# Patient Record
Sex: FEMALE | Race: WHITE | Employment: OTHER | URBAN - METROPOLITAN AREA
[De-identification: names, ages, dates, MRNs, and addresses within clinical notes are randomized per-mention and may not be internally consistent; named-entity substitution may affect disease eponyms.]

---

## 2018-08-11 ENCOUNTER — HOSPITAL ENCOUNTER (OUTPATIENT)
Dept: MAMMOGRAPHY | Age: 58
Discharge: HOME | End: 2018-08-11
Payer: COMMERCIAL

## 2018-08-11 DIAGNOSIS — Z12.31 VISIT FOR SCREENING MAMMOGRAM: ICD-10-CM

## 2018-08-11 PROCEDURE — 77063 BREAST TOMOSYNTHESIS BI: CPT

## 2020-08-10 PROBLEM — Z12.11 SPECIAL SCREENING FOR MALIGNANT NEOPLASMS, COLON: Status: ACTIVE | Noted: 2020-08-10

## 2020-08-17 ENCOUNTER — HOSPITAL ENCOUNTER (OUTPATIENT)
Dept: MAMMOGRAPHY | Age: 60
Discharge: HOME | End: 2020-08-17
Attending: FAMILY MEDICINE
Payer: COMMERCIAL

## 2020-08-17 DIAGNOSIS — Z80.3 FAMILY HX-BREAST MALIGNANCY: ICD-10-CM

## 2020-08-17 DIAGNOSIS — Z12.31 VISIT FOR SCREENING MAMMOGRAM: ICD-10-CM

## 2020-08-17 PROCEDURE — 77063 BREAST TOMOSYNTHESIS BI: CPT

## 2020-12-15 ENCOUNTER — HOSPITAL ENCOUNTER (OUTPATIENT)
Dept: SURGERY | Age: 60
Discharge: HOME | End: 2020-12-15
Attending: ORTHOPAEDIC SURGERY
Payer: COMMERCIAL

## 2020-12-15 ENCOUNTER — HOSPITAL ENCOUNTER (OUTPATIENT)
Dept: PHYSICAL THERAPY | Age: 60
Discharge: HOME | End: 2020-12-15
Attending: ORTHOPAEDIC SURGERY
Payer: COMMERCIAL

## 2020-12-15 ENCOUNTER — HOME HEALTH ADMISSION (OUTPATIENT)
Dept: HOME HEALTH SERVICES | Facility: HOME HEALTH | Age: 60
End: 2020-12-15
Payer: COMMERCIAL

## 2020-12-15 VITALS
OXYGEN SATURATION: 97 % | RESPIRATION RATE: 12 BRPM | HEART RATE: 71 BPM | DIASTOLIC BLOOD PRESSURE: 92 MMHG | BODY MASS INDEX: 33.23 KG/M2 | HEIGHT: 62 IN | WEIGHT: 180.6 LBS | SYSTOLIC BLOOD PRESSURE: 159 MMHG | TEMPERATURE: 98.4 F

## 2020-12-15 LAB
ANION GAP SERPL CALC-SCNC: 8 MMOL/L (ref 7–16)
APTT PPP: 27 SEC (ref 24.1–35.1)
BACTERIA SPEC CULT: NORMAL
BASOPHILS # BLD: 0 K/UL (ref 0–0.2)
BASOPHILS NFR BLD: 1 % (ref 0–2)
BUN SERPL-MCNC: 14 MG/DL (ref 8–23)
CALCIUM SERPL-MCNC: 9.4 MG/DL (ref 8.3–10.4)
CHLORIDE SERPL-SCNC: 108 MMOL/L (ref 98–107)
CO2 SERPL-SCNC: 24 MMOL/L (ref 21–32)
CREAT SERPL-MCNC: 0.77 MG/DL (ref 0.6–1)
DIFFERENTIAL METHOD BLD: NORMAL
EOSINOPHIL # BLD: 0.1 K/UL (ref 0–0.8)
EOSINOPHIL NFR BLD: 1 % (ref 0.5–7.8)
ERYTHROCYTE [DISTWIDTH] IN BLOOD BY AUTOMATED COUNT: 13.2 % (ref 11.9–14.6)
EST. AVERAGE GLUCOSE BLD GHB EST-MCNC: 120 MG/DL
GLUCOSE SERPL-MCNC: 88 MG/DL (ref 65–100)
HBA1C MFR BLD: 5.8 %
HCT VFR BLD AUTO: 41.8 % (ref 35.8–46.3)
HGB BLD-MCNC: 13.8 G/DL (ref 11.7–15.4)
IMM GRANULOCYTES # BLD AUTO: 0 K/UL (ref 0–0.5)
IMM GRANULOCYTES NFR BLD AUTO: 0 % (ref 0–5)
INR PPP: 1
LYMPHOCYTES # BLD: 1.4 K/UL (ref 0.5–4.6)
LYMPHOCYTES NFR BLD: 24 % (ref 13–44)
MCH RBC QN AUTO: 28.6 PG (ref 26.1–32.9)
MCHC RBC AUTO-ENTMCNC: 33 G/DL (ref 31.4–35)
MCV RBC AUTO: 86.5 FL (ref 79.6–97.8)
MONOCYTES # BLD: 0.5 K/UL (ref 0.1–1.3)
MONOCYTES NFR BLD: 8 % (ref 4–12)
NEUTS SEG # BLD: 3.9 K/UL (ref 1.7–8.2)
NEUTS SEG NFR BLD: 66 % (ref 43–78)
NRBC # BLD: 0 K/UL (ref 0–0.2)
PLATELET # BLD AUTO: 336 K/UL (ref 150–450)
PMV BLD AUTO: 9.5 FL (ref 9.4–12.3)
POTASSIUM SERPL-SCNC: 3.9 MMOL/L (ref 3.5–5.1)
PROTHROMBIN TIME: 13.4 SEC (ref 12.5–14.7)
RBC # BLD AUTO: 4.83 M/UL (ref 4.05–5.2)
SERVICE CMNT-IMP: NORMAL
SODIUM SERPL-SCNC: 140 MMOL/L (ref 136–145)
WBC # BLD AUTO: 5.9 K/UL (ref 4.3–11.1)

## 2020-12-15 PROCEDURE — 85730 THROMBOPLASTIN TIME PARTIAL: CPT

## 2020-12-15 PROCEDURE — 87641 MR-STAPH DNA AMP PROBE: CPT

## 2020-12-15 PROCEDURE — 83036 HEMOGLOBIN GLYCOSYLATED A1C: CPT

## 2020-12-15 PROCEDURE — 36415 COLL VENOUS BLD VENIPUNCTURE: CPT

## 2020-12-15 PROCEDURE — 85025 COMPLETE CBC W/AUTO DIFF WBC: CPT

## 2020-12-15 PROCEDURE — 77030027138 HC INCENT SPIROMETER -A

## 2020-12-15 PROCEDURE — 97161 PT EVAL LOW COMPLEX 20 MIN: CPT

## 2020-12-15 PROCEDURE — 85610 PROTHROMBIN TIME: CPT

## 2020-12-15 PROCEDURE — 80048 BASIC METABOLIC PNL TOTAL CA: CPT

## 2020-12-15 RX ORDER — ACYCLOVIR 50 MG/G
CREAM TOPICAL AS NEEDED
COMMUNITY
End: 2020-12-15 | Stop reason: SDUPTHER

## 2020-12-15 NOTE — PERIOP NOTES
Lab results within anesthesia guidelines. MRSA swab result pending. All results routed/faxed to pt's PCP, Ghazal Barber MD, per surgeon's order. Recent Results (from the past 12 hour(s))   CBC WITH AUTOMATED DIFF    Collection Time: 12/15/20  9:38 AM   Result Value Ref Range    WBC 5.9 4.3 - 11.1 K/uL    RBC 4.83 4.05 - 5.2 M/uL    HGB 13.8 11.7 - 15.4 g/dL    HCT 41.8 35.8 - 46.3 %    MCV 86.5 79.6 - 97.8 FL    MCH 28.6 26.1 - 32.9 PG    MCHC 33.0 31.4 - 35.0 g/dL    RDW 13.2 11.9 - 14.6 %    PLATELET 027 148 - 043 K/uL    MPV 9.5 9.4 - 12.3 FL    ABSOLUTE NRBC 0.00 0.0 - 0.2 K/uL    DF AUTOMATED      NEUTROPHILS 66 43 - 78 %    LYMPHOCYTES 24 13 - 44 %    MONOCYTES 8 4.0 - 12.0 %    EOSINOPHILS 1 0.5 - 7.8 %    BASOPHILS 1 0.0 - 2.0 %    IMMATURE GRANULOCYTES 0 0.0 - 5.0 %    ABS. NEUTROPHILS 3.9 1.7 - 8.2 K/UL    ABS. LYMPHOCYTES 1.4 0.5 - 4.6 K/UL    ABS. MONOCYTES 0.5 0.1 - 1.3 K/UL    ABS. EOSINOPHILS 0.1 0.0 - 0.8 K/UL    ABS. BASOPHILS 0.0 0.0 - 0.2 K/UL    ABS. IMM.  GRANS. 0.0 0.0 - 0.5 K/UL   METABOLIC PANEL, BASIC    Collection Time: 12/15/20  9:38 AM   Result Value Ref Range    Sodium 140 136 - 145 mmol/L    Potassium 3.9 3.5 - 5.1 mmol/L    Chloride 108 (H) 98 - 107 mmol/L    CO2 24 21 - 32 mmol/L    Anion gap 8 7 - 16 mmol/L    Glucose 88 65 - 100 mg/dL    BUN 14 8 - 23 MG/DL    Creatinine 0.77 0.6 - 1.0 MG/DL    GFR est AA >60 >60 ml/min/1.73m2    GFR est non-AA >60 >60 ml/min/1.73m2    Calcium 9.4 8.3 - 10.4 MG/DL   PROTHROMBIN TIME + INR    Collection Time: 12/15/20  9:38 AM   Result Value Ref Range    Prothrombin time 13.4 12.5 - 14.7 sec    INR 1.0     PTT    Collection Time: 12/15/20  9:38 AM   Result Value Ref Range    aPTT 27.0 24.1 - 35.1 SEC   HEMOGLOBIN A1C WITH EAG    Collection Time: 12/15/20  9:39 AM   Result Value Ref Range    Hemoglobin A1c 5.8 %    Est. average glucose 120 mg/dL

## 2020-12-15 NOTE — PROGRESS NOTES
Iker Echeverria  : (70 y.o.) Joint Christy Hagen at [address] [address].  [phone]       Physical Therapy Prehab Plan of Treatment and Evaluation Summary:12/15/2020    ICD-10: Treatment Diagnosis:   · Pain in Left Knee (M25.562)  · Stiffness of Left Knee, Not elsewhere classified (N88.283)  Precautions/Allergies:   Patient has no known allergies. MEDICAL/REFERRING DIAGNOSIS:  Unilateral primary osteoarthritis, left knee [M17.12]  REFERRING PHYSICIAN: Ceasar Mcdermott MD  DATE OF SURGERY: 21    Assessment:   Comments:  She is here alone. She plans on having a L TKA and needs a R TKA. She will go home at [place] and her  Spouse will offer assistance     PROBLEM LIST (Impacting functional limitations):  Ms. Yamil Espinoza presents with the following left lower extremity(s) problems:  1. Strength  2. Range of Motion  3. Home Exercise Program  4. Pain   INTERVENTIONS PLANNED:  1. Home Exercise Program  2. Educational Discussion      TREATMENT PLAN: Effective Dates: 12/15/2020 TO 12/15/2020. Frequency/Duration: Patient to continue to perform home exercise program at least twice per day up until her surgery. GOALS: (Goals have been discussed and agreed upon with patient.)  Discharge Goals: Time Frame: 1 Day  1. Patient will demonstrate independence with a home exercise program designed to increase strength, range of motion and pain control to minimize functional deficits and optimize patient for total joint replacement. Rehabilitation Potential For Stated Goals: Good  Regarding Dante Bhakta's therapy, I certify that the treatment plan above will be carried out by a therapist or under their direction.   Thank you for this referral,  Luana Lang, PT               HISTORY:   Present Symptoms:  Pain Intensity 1: 4(at its worst)  Pain Location 1: Knee  Pain Orientation 1: Anterior, Lateral, Medial, Posterior, Left   History of Present Injury/Illness (Reason for Referral):  Medical/Referring Diagnosis: Unilateral primary osteoarthritis, left knee [M17.12]   Past Medical History/Comorbidities:   Ms. Melissa Valentine  has a past medical history of Arthritis, Constipation, High cholesterol, and Postmenopausal.  Ms. Melissa Valentine  has a past surgical history that includes hx hysterectomy (2002); hx breast biopsy (Left, 03/18/2013); hx cholecystectomy (12/19/2011); hx orthopaedic (Left, 03/20/2014); and hx knee arthroscopy (Left, 2005).   Social History/Living Environment:   Home Environment: Private residence  # Steps to Enter: 3  Hand Rails : Bilateral  Wheelchair Ramp: (spouse is building one)  One/Two Story Residence: Two story, live on 1st floor  # of Interior Steps: 12  Interior Rails: Left  Support Systems: Spouse/Significant Other/Partner  Patient Expects to be Discharged to[de-identified] Private residence  Current DME Used/Available at Home: [address] [facility], [place], [facility], [facility], [address] chair  Tub or Shower Type: Tub/Shower combination  Work/Activity:  retired  Dominant Side:  RIGHT  Current Medications:  See Pre-assessment nursing note   Number of Personal Factors/Comorbidities that affect the Plan of Care: 1-2: MODERATE COMPLEXITY   EXAMINATION:   ADLs (Current Functional Status):   Ambulation:  [x] Independent  [] Walk Indoors Only  [] Walk Outdoors  [] Use Assistive Device  [] Use Wheelchair Only Dressing:  [x] Independent  Requires Assistance from Someone for:  [] Sock/Shoes  [] Pants  [] Everything   Bathing/Showering:   [x] Independent  [] Requires Assistance from Someone  [] [address]:  [x] Routine house and yard work  [] Light Housework Only  [] None   Observation/Orthostatic Postural Assessment:    Genu valgus left, Genu valgus right, Leg length discrepancy, right(R LE 1/8\" shorter than L)  ROM/Flexibility:   AROM: Within functional limits(R LE)                LLE AROM  L Knee Flexion: 72  L Knee Extension: 10          Strength:   Strength: Generally decreased, functional(R LE 4/5)       LLE Strength  L Hip Flexion: 4  L Knee Extension: 3+  L Ankle Dorsiflexion: 4          Functional Mobility:    Sensation: Intact(R LE)    Stand to Sit: Independent  Sit to Stand: Independent  Stand Pivot Transfers: Independent             Balance:    Sitting: Intact  Standing: Intact   Body Structures Involved:  1. Bones  2. Joints  3. Muscles Body Functions Affected:  1. Movement Related Activities and Participation Affected:  1. General Tasks and Demands  2. Mobility   Number of elements that affect the Plan of Care: 4+: HIGH COMPLEXITY   CLINICAL PRESENTATION:   Presentation: Stable and uncomplicated: LOW COMPLEXITY   CLINICAL DECISION MAKING:   Outcome Measure: Tool Used: Lower Extremity Functional Scale (LEFS)  Score:  Initial: 70/80 Most Recent: X/80 (Date: -- )   Interpretation of Score: 20 questions each scored on a 5 point scale with 0 representing \"extreme difficulty or unable to perform\" and 4 representing \"no difficulty\". The lower the score, the greater the functional disability. 80/80 represents no disability. Minimal detectable change is 9 points. Medical Necessity:   · Ms. Becky Soler is expected to optimize her lower extremity strength and ROM in preparation for joint replacement surgery. Reason for Services/Other Comments:  · Achieve baseline assesment of musculoskeletal system, functional mobility and home environment. , educate in PT HEP in preparation for surgery, educate in hospital plan of care. Use of outcome tool(s) and clinical judgement create a POC that gives a: Clear prediction of patient's progress: LOW COMPLEXITY   TREATMENT:   Treatment/Session Assessment:  Patient was instructed in PT- HEP to increase strength and ROM in LEs. Answered all questions. · Post session pain:  2  · Compliance with Program/Exercises: compliant most of the time.   Total Treatment Duration:  PT Patient Time In/Time Out  Time In: 0930  Time Out: [address] Elizondo, PT

## 2020-12-15 NOTE — PERIOP NOTES
PLEASE CONTINUE TAKING ALL PRESCRIPTION MEDICATIONS UP TO THE DAY OF SURGERY UNLESS OTHERWISE DIRECTED BELOW. DISCONTINUE all vitamins and supplements 21 days prior to surgery. DISCONTINUE Non-Steriodal Anti-Inflammatory (NSAIDS) such as Advil and Aleve 5 days prior to surgery. Home Medications to take  the day of surgery              Home Medications   to Hold           Comments   BRING: Livalo and acyclovir cream in original RX bottle        *Visitor policy of 1 visitor per patient discussed. Please do not bring home medications with you on the day of surgery unless otherwise directed by your nurse. If you are instructed to bring home medications, please give them to your nurse as they will be administered by the nursing staff. If you have any questions, please call [address] [place] [address] [phone] or [facility] [phone]. A copy of this note was provided to the patient for reference.

## 2020-12-15 NOTE — ADVANCED PRACTICE NURSE
Total Joint Surgery Preoperative Chart Review      Patient ID:  Juan David  [id number]  9 y.o.  1960  Surgeon: Dr. Wesley Fisher  Date of Surgery: 1/7/2021  Procedure: Total Left Knee Arthroplasty  Primary Care Physician: Rochelle Aguirre [phone]  Specialty Physician(s):      Subjective:   Juan David is a 56 y.o. WHITE OR  female who presents for preoperative evaluation for Total Left Knee arthroplasty. This is a preoperative chart review note based on data collected by the nurse at the surgical Pre-Assessment visit. Past Medical History:   Diagnosis Date    Arthritis     Constipation     High cholesterol     managed with medication    Postmenopausal     Varicose vein of leg     left leg       Past Surgical History:   Procedure Laterality Date    HX BREAST BIOPSY Left 03/18/2013    HX CHOLECYSTECTOMY  12/19/2011    HX HYSTERECTOMY  2002    vaginal    HX KNEE ARTHROSCOPY Left 2005    meniscus repair    HX ORTHOPAEDIC Left 03/20/2014    foot sx     Family History   Problem Relation Age of Onset    Breast Cancer Mother 72    Heart Disease Father 54        sudden death - MI vs ? LQT    Other Niece         LQTS    Other Nephew         LQTS      Social History     Tobacco Use    Smoking status: Never Smoker    Smokeless tobacco: Never Used   Substance Use Topics    Alcohol use: Yes     Alcohol/week: 1.0 standard drinks     Types: 1 Glasses of wine per week     Frequency: 2-4 times a month       Prior to Admission medications    Medication Sig Start Date End Date Taking? Authorizing Provider   acyclovir (ZOVIRAX) 5 % topical cream Apply  to affected area as needed. Yes Provider, Historical   psyllium husk (METAMUCIL PO) Take  by mouth every other day. Yes Provider, Historical   aspirin delayed-release 81 mg tablet Take 81 mg by mouth every other day. Yes Provider, Historical   pitavastatin calcium (Livalo) 4 mg tab tablet Take 1 Tab by mouth nightly.  11/19/20   Prudence Cord, Trey Malin MD     No Known Allergies       Objective:     Physical Exam:   Patient Vitals for the past 24 hrs:   Temp Pulse Resp BP SpO2   12/15/20 1033 98.4 °F (36.9 °C) 71 12 (!) 159/92 97 %       ECG:    EKG Results     None          Data Review:   Labs:   Recent Results (from the past 24 hour(s))   CBC WITH AUTOMATED DIFF    Collection Time: 12/15/20  9:38 AM   Result Value Ref Range    WBC 5.9 4.3 - 11.1 K/uL    RBC 4.83 4.05 - 5.2 M/uL    HGB 13.8 11.7 - 15.4 g/dL    HCT 41.8 35.8 - 46.3 %    MCV 86.5 79.6 - 97.8 FL    MCH 28.6 26.1 - 32.9 PG    MCHC 33.0 31.4 - 35.0 g/dL    RDW 13.2 11.9 - 14.6 %    PLATELET 002 901 - 224 K/uL    MPV 9.5 9.4 - 12.3 FL    ABSOLUTE NRBC 0.00 0.0 - 0.2 K/uL    DF AUTOMATED      NEUTROPHILS 66 43 - 78 %    LYMPHOCYTES 24 13 - 44 %    MONOCYTES 8 4.0 - 12.0 %    EOSINOPHILS 1 0.5 - 7.8 %    BASOPHILS 1 0.0 - 2.0 %    IMMATURE GRANULOCYTES 0 0.0 - 5.0 %    ABS. NEUTROPHILS 3.9 1.7 - 8.2 K/UL    ABS. LYMPHOCYTES 1.4 0.5 - 4.6 K/UL    ABS. MONOCYTES 0.5 0.1 - 1.3 K/UL    ABS. EOSINOPHILS 0.1 0.0 - 0.8 K/UL    ABS. BASOPHILS 0.0 0.0 - 0.2 K/UL    ABS. IMM.  GRANS. 0.0 0.0 - 0.5 K/UL   METABOLIC PANEL, BASIC    Collection Time: 12/15/20  9:38 AM   Result Value Ref Range    Sodium 140 136 - 145 mmol/L    Potassium 3.9 3.5 - 5.1 mmol/L    Chloride 108 (H) 98 - 107 mmol/L    CO2 24 21 - 32 mmol/L    Anion gap 8 7 - 16 mmol/L    Glucose 88 65 - 100 mg/dL    BUN 14 8 - 23 MG/DL    Creatinine 0.77 0.6 - 1.0 MG/DL    GFR est AA >60 >60 ml/min/1.73m2    GFR est non-AA >60 >60 ml/min/1.73m2    Calcium 9.4 8.3 - 10.4 MG/DL   PROTHROMBIN TIME + INR    Collection Time: 12/15/20  9:38 AM   Result Value Ref Range    Prothrombin time 13.4 12.5 - 14.7 sec    INR 1.0     PTT    Collection Time: 12/15/20  9:38 AM   Result Value Ref Range    aPTT 27.0 24.1 - 35.1 SEC   HEMOGLOBIN A1C WITH EAG    Collection Time: 12/15/20  9:39 AM   Result Value Ref Range    Hemoglobin A1c 5.8 %    Est. average glucose 120 mg/dL         Problem List:  )  Patient Active Problem List   Diagnosis Code    Family hx-breast malignancy Z80.3    Special screening for malignant neoplasms, colon Z12.11       Total Joint Surgery Pre-Assessment Recommendations:           Recommend continuous saturation monitoring hours of sleep, during hospitalization. O2 prn per respiratory protocol.        Signed By: Ashleigh Ontiveros NP-C    December 15, 2020

## 2020-12-15 NOTE — PROGRESS NOTES
12/15/20 0900   Oxygen Therapy   O2 Sat (%) 95 %   Pulse via Oximetry 70 beats per minute   O2 Device Room air   Pre-Treatment   Breath Sounds Bilateral Clear   Pre FEV1 (liters) 2.3 liters   % Predicted 95     Initial respiratory Assessment completed with pt. Pt was interviewed and evaluated in Joint camp prior to surgery. Patient ID:  Kenisha Valerio  [id number]  51 y.o.  1960  Surgeon: Dr. Morgan Beach  Date of Surgery: 1/7/2021  Procedure: Total Left Knee Arthroplasty  Primary Care Physician: Myron Atkinson [phone]  Specialists:     Pt. IS SOMEWHAT PRACTICING SOCIAL DISTANCING AND WEARING A MASK WHEN IN PUBLIC. Pt taught proper COUGH technique  DIAPHRAGMATIC BREATHING EXERCISE INSTRUCTIONS GIVEN    History of smoking:   DENIES                 Quit date:         Secondhand smoke:FATHER    Past procedures with Oxygen desaturation or delayed awakening:DENIES    Past Medical History:   Diagnosis Date    Arthritis     Constipation     High cholesterol     managed with medication    Postmenopausal     Varicose vein of leg     left leg         Respiratory history:DENIES SOB                                                                  Respiratory meds:  DENIES    FAMILY PRESENT:             NO                                                   PAST SLEEP STUDY:                       DENIES  HX OF MADDIE:                                      DENIES  MADDIE assessment:                                               SLEEPS ON       STOMACH  PHYSICAL EXAM   Body mass index is 33.03 kg/m².    Visit Vitals  BP (!) 159/92 (BP 1 Location: Left arm, BP Patient Position: At rest;Sitting)   Pulse 71   Temp 98.4 °F (36.9 °C)   Resp 12   Ht 5' 2\" (1.575 m)   Wt 81.9 kg (180 lb 9.6 oz)   SpO2 97%   BMI 33.03 kg/m²     Neck circumference:  32    cm    Loud snoring:                                                 YES             Witnessed apnea or wakening gasping or choking:        DENIES        Awakens with headaches: DENIES  Morning or daytime tiredness/ sleepiness:                      DENIES           Dry mouth or sore throat in morning:                     DENIES                                   Presley stage:  4                                   SACS score:1  Stop Bang   STOP-BANG  Does the patient snore loudly (louder than talking or loud enough to be heard through closed doors)?: Yes  Does the patient often feel tired, fatigued, or sleepy during the daytime, even after a \"good\" night's sleep?: No  Has anyone ever observed the patient stop breathing during their sleep? : No  Does the patient have or are they being treated for high blood pressure?: No  Is the patient's BMI greater than 35?: No  Is your neck circumference greater than 17 inches (Male) or 16 inches (Female)?: No  Is the patient older than 48?: Yes  Is the patient male?: No  MADDIE Score: 2                            CS HS                                        Referrals:    Pt. Phone Number:

## 2020-12-15 NOTE — PERIOP NOTES
Patient verified name and . Order for consent found in EHR and matches case posting; patient verified. Type 3 surgery, joint PAT assessment complete. Labs per surgeon: CBC, BMP, PT/PTT, Hgba1c- results WNL per anesthesia guidelines  Labs per anesthesia protocol: no additional  EKG: done 2020- tracing in EMR and result reviewed by Dr Alondra Dooley ([facility] cardio)    Pt had cardiac consult with Dr Alondra Dooley at [address] Cardiology 2020 for 'chest discomfort' and family hx of possible LQT. Note states: \"Symptoms have essentially resolved with change of medications, no further evaluation at this time but return should symptoms change or recur. .. Low perioperative risk for knee surgery. \"    Patient aware that a negative Covid swab result is required to proceed with surgery; appointments are made by the surgeon office and should test should be collected 7 days prior to surgery. [facility] [place] MRSA/MSSA swab collected; pharmacy to review and dose antibiotic as appropriate. Hospital approved surgical skin cleanser and instructions to return bottle on DOS given per hospital policy. Patient provided with handouts including Guide to Surgery, Pain Management, Hand Hygiene, Blood Transfusion Education, and [facility] Brochure. Patient answered medical/surgical history questions at their best of ability. All prior to admission medications documented in [facility]. Original medication prescription bottle (x1) visualized during patient appointment. Patient instructed to hold all vitamins 3 weeks prior to surgery and NSAIDS 5 days prior to surgery. Patient teach back successful and patient demonstrates knowledge of instruction.

## 2021-01-04 NOTE — H&P
H&P    Patient ID:  Trace Mcclain  [id number]  30 y.o.  1960  Surgeon:  Chris Melo MD  Date of Surgery: * No surgery date entered *  Procedure: robot assisted  Left Knee Total Arthroplasty  Primary Care Physician: Parminder Cassidy MD        Subjective:  Trace Mcclain is a 56 y.o. WHITE OR  female who presents with Left Knee pain. She has history of Left Knee pain for several months. Symptoms worse with walking long distances and relieved with rest. Conservative treatment consisting of  activity modification and injections have not helped. The patient lives with their family. The patients goal after surgery is improved pain and function. Past Medical History:   Diagnosis Date    Arthritis     Constipation     High cholesterol     managed with medication    Postmenopausal     Varicose vein of leg     left leg       Past Surgical History:   Procedure Laterality Date    HX BREAST BIOPSY Left 03/18/2013    HX CHOLECYSTECTOMY  12/19/2011    HX HYSTERECTOMY  2002    vaginal    HX KNEE ARTHROSCOPY Left 2005    meniscus repair    HX ORTHOPAEDIC Left 03/20/2014    foot sx     Family History   Problem Relation Age of Onset    Breast Cancer Mother 72    Heart Disease Father 54        sudden death - MI vs ? LQT    Other Niece         LQTS    Other Nephew         LQTS      Social History     Tobacco Use    Smoking status: Never Smoker    Smokeless tobacco: Never Used   Substance Use Topics    Alcohol use: Yes     Alcohol/week: 1.0 standard drinks     Types: 1 Glasses of wine per week     Frequency: 2-4 times a month       Prior to Admission medications    Medication Sig Start Date End Date Taking? Authorizing Provider   acyclovir (ZOVIRAX) 5 % topical cream Apply  to affected area as needed (Cold Sores). 12/15/20   Parminder Cassidy MD   pitavastatin calcium (Livalo) 4 mg tab tablet Take 1 Tab by mouth nightly.  11/19/20   Parminder Cassidy MD   psyllium husk (METAMUCIL PO) Take by mouth every other day. Provider, Historical   aspirin delayed-release 81 mg tablet Take 81 mg by mouth every other day. Provider, Historical     No Known Allergies     REVIEW OF SYSTEMS:  CONSTITUTIONAL: Denies fever, decreased appetite, weight loss/gain, night sweats or fatigue. HEENT: Denies vision or hearing changes. denies glasses. denies hearing aids. CARDIAC: Denies CP, palpitations, rheumatic fever, murmur, peripheral edema, carotid artery disease or syncopal episodes. RESPIRATORY: Denies dyspnea on exertion, asthma, COPD or orthopnea. GI: Denies GERD, history of GI bleed or melena, PUD, hepatitis or cirrhosis. : Denies dysuria, hematuria. denies BPH symptoms. HEMATOLOGIC: Denies anemia or blood disorders. ENDOCRINE: Denies thyroid disease. MUSCULOSKELETAL: See HPI. NEUROLOGIC: Denies seizure, peripheral neuropathy or memory loss. PSYCH: Denies depression, anxiety or insomnia. SKIN: Denies rash or open sores. Objective:    PHYSICAL EXAM  GENERAL: No data found. EYES: PERRL. EOM intact. MOUTH:Teeth and Gums normal. NECK: Full ROM. Trachea midline. No thyromegaly or JVD. CARDIOVASCULAR: Regular rate and rhythm. No murmur or gallops. No carotid bruits. Peripheral pulses: radial 2 +, PT 2+, DP 2+ bilaterally. LUNGS: CTA bilaterally. No wheezes, rhonchi or rales. GI: positive BS. Abdomen nontender. NEUROLOGIC: Alert and oriented x 3. Bilateral equal strong had grasp and bilateral equal strong plantar flexion and dorsiflexion. GAIT: abnormal MUSCULOSKELETAL: ROM: full with pain. Tenderness: over the medial and lateral joint lines. Crepitus: present. SKIN: No rash, bruising, swelling, redness or warmth. Labs:  No results found for this or any previous visit (from the past 24 hour(s)). Xray Left Knee: joint space narrowing    Assessment:  Advanced Left Knee Osteoarthritis. Robot assisted Total Left Knee Arthroplasty Indicated.   Patient Active Problem List   Diagnosis Code    Family hx-breast malignancy Z80.3    Special screening for malignant neoplasms, colon Z12.11       Plan:  I have advised the patient of the risks and consequences, including possible complications of performing total joint replacement, as well as not doing this operation. The patient had the opportunity to ask questions and have them answered to their satisfaction.      Signed:  HAYDEN Winters 1/4/2021

## 2021-01-06 ENCOUNTER — ANESTHESIA EVENT (OUTPATIENT)
Dept: SURGERY | Age: 61
End: 2021-01-06
Payer: COMMERCIAL

## 2021-01-07 ENCOUNTER — HOSPITAL ENCOUNTER (OUTPATIENT)
Age: 61
Discharge: HOME HEALTH | End: 2021-01-08
Attending: ORTHOPAEDIC SURGERY | Admitting: ORTHOPAEDIC SURGERY
Payer: COMMERCIAL

## 2021-01-07 ENCOUNTER — ANESTHESIA (OUTPATIENT)
Dept: SURGERY | Age: 61
End: 2021-01-07
Payer: COMMERCIAL

## 2021-01-07 DIAGNOSIS — Z96.652 TOTAL KNEE REPLACEMENT STATUS, LEFT: Primary | ICD-10-CM

## 2021-01-07 PROBLEM — M17.12 ARTHRITIS OF KNEE, LEFT: Status: ACTIVE | Noted: 2021-01-07

## 2021-01-07 LAB
GLUCOSE BLD STRIP.AUTO-MCNC: 91 MG/DL (ref 65–100)
HGB BLD-MCNC: 12.9 G/DL (ref 11.7–15.4)

## 2021-01-07 PROCEDURE — 77030040922 HC BLNKT HYPOTHRM STRY -A: Performed by: ANESTHESIOLOGY

## 2021-01-07 PROCEDURE — C1776 JOINT DEVICE (IMPLANTABLE): HCPCS | Performed by: ORTHOPAEDIC SURGERY

## 2021-01-07 PROCEDURE — 77030003665 HC NDL SPN BBMI -A: Performed by: STUDENT IN AN ORGANIZED HEALTH CARE EDUCATION/TRAINING PROGRAM

## 2021-01-07 PROCEDURE — C1713 ANCHOR/SCREW BN/BN,TIS/BN: HCPCS | Performed by: ORTHOPAEDIC SURGERY

## 2021-01-07 PROCEDURE — 76942 ECHO GUIDE FOR BIOPSY: CPT | Performed by: ORTHOPAEDIC SURGERY

## 2021-01-07 PROCEDURE — 77030020275 HC MISC ORTHOPEDIC

## 2021-01-07 PROCEDURE — 77030038023 HC PIN FIX MAKO STRY -B: Performed by: ORTHOPAEDIC SURGERY

## 2021-01-07 PROCEDURE — 76010010054 HC POST OP PAIN BLOCK: Performed by: ORTHOPAEDIC SURGERY

## 2021-01-07 PROCEDURE — 82962 GLUCOSE BLOOD TEST: CPT

## 2021-01-07 PROCEDURE — 2709999900 HC NON-CHARGEABLE SUPPLY: Performed by: ORTHOPAEDIC SURGERY

## 2021-01-07 PROCEDURE — 94760 N-INVAS EAR/PLS OXIMETRY 1: CPT

## 2021-01-07 PROCEDURE — 77030011208: Performed by: ORTHOPAEDIC SURGERY

## 2021-01-07 PROCEDURE — 77030013819 HC MX SYS CEM ZIMM -B: Performed by: ORTHOPAEDIC SURGERY

## 2021-01-07 PROCEDURE — 97535 SELF CARE MNGMENT TRAINING: CPT

## 2021-01-07 PROCEDURE — 76060000034 HC ANESTHESIA 1.5 TO 2 HR: Performed by: ORTHOPAEDIC SURGERY

## 2021-01-07 PROCEDURE — 74011250636 HC RX REV CODE- 250/636: Performed by: STUDENT IN AN ORGANIZED HEALTH CARE EDUCATION/TRAINING PROGRAM

## 2021-01-07 PROCEDURE — 77030029829 HC TIB INST CKPNT DISP STRY -B: Performed by: ORTHOPAEDIC SURGERY

## 2021-01-07 PROCEDURE — 77030012935 HC DRSG AQUACEL BMS -B: Performed by: ORTHOPAEDIC SURGERY

## 2021-01-07 PROCEDURE — 85018 HEMOGLOBIN: CPT

## 2021-01-07 PROCEDURE — 77030016544 HC BLD SAW RECIP1 STRY -B: Performed by: ORTHOPAEDIC SURGERY

## 2021-01-07 PROCEDURE — 97165 OT EVAL LOW COMPLEX 30 MIN: CPT

## 2021-01-07 PROCEDURE — 77030019557 HC ELECTRD VES SEAL MEDT -F: Performed by: ORTHOPAEDIC SURGERY

## 2021-01-07 PROCEDURE — 77030029830 HC FEM INST CKPNT DISP STRY -B: Performed by: ORTHOPAEDIC SURGERY

## 2021-01-07 PROCEDURE — 65270000029 HC RM PRIVATE

## 2021-01-07 PROCEDURE — 74011000250 HC RX REV CODE- 250: Performed by: STUDENT IN AN ORGANIZED HEALTH CARE EDUCATION/TRAINING PROGRAM

## 2021-01-07 PROCEDURE — 74011250636 HC RX REV CODE- 250/636: Performed by: NURSE ANESTHETIST, CERTIFIED REGISTERED

## 2021-01-07 PROCEDURE — 76210000006 HC OR PH I REC 0.5 TO 1 HR: Performed by: ORTHOPAEDIC SURGERY

## 2021-01-07 PROCEDURE — 77030006720 HC BLD PAT RMR ZIMM -B: Performed by: ORTHOPAEDIC SURGERY

## 2021-01-07 PROCEDURE — 77030003602 HC NDL NRV BLK BBMI -B: Performed by: STUDENT IN AN ORGANIZED HEALTH CARE EDUCATION/TRAINING PROGRAM

## 2021-01-07 PROCEDURE — 77030033067 HC SUT PDO STRATFX SPIR J&J -B: Performed by: ORTHOPAEDIC SURGERY

## 2021-01-07 PROCEDURE — 74011000258 HC RX REV CODE- 258: Performed by: ORTHOPAEDIC SURGERY

## 2021-01-07 PROCEDURE — 77030037088 HC TUBE ENDOTRACH ORAL NSL COVD-A: Performed by: STUDENT IN AN ORGANIZED HEALTH CARE EDUCATION/TRAINING PROGRAM

## 2021-01-07 PROCEDURE — 74011250636 HC RX REV CODE- 250/636: Performed by: ANESTHESIOLOGY

## 2021-01-07 PROCEDURE — 74011250637 HC RX REV CODE- 250/637: Performed by: ORTHOPAEDIC SURGERY

## 2021-01-07 PROCEDURE — 74011250636 HC RX REV CODE- 250/636: Performed by: ORTHOPAEDIC SURGERY

## 2021-01-07 PROCEDURE — 76010000162 HC OR TIME 1.5 TO 2 HR INTENSV-TIER 1: Performed by: ORTHOPAEDIC SURGERY

## 2021-01-07 PROCEDURE — 74011000250 HC RX REV CODE- 250: Performed by: ORTHOPAEDIC SURGERY

## 2021-01-07 PROCEDURE — 77030038149 HC BLD SAW SAG STRY -D: Performed by: ORTHOPAEDIC SURGERY

## 2021-01-07 PROCEDURE — 97161 PT EVAL LOW COMPLEX 20 MIN: CPT

## 2021-01-07 PROCEDURE — 74011000250 HC RX REV CODE- 250: Performed by: ANESTHESIOLOGY

## 2021-01-07 PROCEDURE — 77030035236 HC SUT PDS STRATFX BARB J&J -B: Performed by: ORTHOPAEDIC SURGERY

## 2021-01-07 PROCEDURE — 77030037715 HC DRSG ZIP STRY -B: Performed by: ORTHOPAEDIC SURGERY

## 2021-01-07 PROCEDURE — 77030029820: Performed by: ORTHOPAEDIC SURGERY

## 2021-01-07 PROCEDURE — 94762 N-INVAS EAR/PLS OXIMTRY CONT: CPT

## 2021-01-07 PROCEDURE — 97110 THERAPEUTIC EXERCISES: CPT

## 2021-01-07 PROCEDURE — 77030031139 HC SUT VCRL2 J&J -A: Performed by: ORTHOPAEDIC SURGERY

## 2021-01-07 PROCEDURE — 74011250637 HC RX REV CODE- 250/637: Performed by: ANESTHESIOLOGY

## 2021-01-07 PROCEDURE — 77030007880 HC KT SPN EPDRL BBMI -B: Performed by: STUDENT IN AN ORGANIZED HEALTH CARE EDUCATION/TRAINING PROGRAM

## 2021-01-07 PROCEDURE — 77030037714 HC CLOSR DEV INCIS ZIP STRY -C: Performed by: ORTHOPAEDIC SURGERY

## 2021-01-07 PROCEDURE — 36415 COLL VENOUS BLD VENIPUNCTURE: CPT

## 2021-01-07 DEVICE — COMPONENT PAT DIA32MM THK10MM SUP INFERIOR ASYM TRIATHLON: Type: IMPLANTABLE DEVICE | Site: KNEE | Status: FUNCTIONAL

## 2021-01-07 DEVICE — BASEPLT TIB UNIV TRIATHLN 5 --: Type: IMPLANTABLE DEVICE | Site: KNEE | Status: FUNCTIONAL

## 2021-01-07 DEVICE — KNEE K1 TOT HEMI STD CEM -- IMPL CAPPED K1: Type: IMPLANTABLE DEVICE | Status: FUNCTIONAL

## 2021-01-07 DEVICE — FEM KNE CEM CR SZ 4 LT -- TRIATHLON: Type: IMPLANTABLE DEVICE | Site: KNEE | Status: FUNCTIONAL

## 2021-01-07 DEVICE — CEMENT BNE BIOMET HV R1X40GM --: Type: IMPLANTABLE DEVICE | Site: KNEE | Status: FUNCTIONAL

## 2021-01-07 DEVICE — IMPLANTABLE DEVICE: Type: IMPLANTABLE DEVICE | Site: KNEE | Status: FUNCTIONAL

## 2021-01-07 RX ORDER — LIDOCAINE HYDROCHLORIDE 20 MG/ML
INJECTION, SOLUTION EPIDURAL; INFILTRATION; INTRACAUDAL; PERINEURAL AS NEEDED
Status: DISCONTINUED | OUTPATIENT
Start: 2021-01-07 | End: 2021-01-07 | Stop reason: HOSPADM

## 2021-01-07 RX ORDER — ACETAMINOPHEN 325 MG/1
975 TABLET ORAL ONCE
Status: DISCONTINUED | OUTPATIENT
Start: 2021-01-07 | End: 2021-01-07

## 2021-01-07 RX ORDER — ACETAMINOPHEN 500 MG
1000 TABLET ORAL EVERY 6 HOURS
Status: DISCONTINUED | OUTPATIENT
Start: 2021-01-07 | End: 2021-01-08 | Stop reason: HOSPADM

## 2021-01-07 RX ORDER — ONDANSETRON 8 MG/1
8 TABLET, ORALLY DISINTEGRATING ORAL
Status: DISCONTINUED | OUTPATIENT
Start: 2021-01-07 | End: 2021-01-08 | Stop reason: HOSPADM

## 2021-01-07 RX ORDER — CEFAZOLIN SODIUM/WATER 2 G/20 ML
2 SYRINGE (ML) INTRAVENOUS ONCE
Status: COMPLETED | OUTPATIENT
Start: 2021-01-07 | End: 2021-01-07

## 2021-01-07 RX ORDER — TRANEXAMIC ACID 100 MG/ML
INJECTION, SOLUTION INTRAVENOUS AS NEEDED
Status: DISCONTINUED | OUTPATIENT
Start: 2021-01-07 | End: 2021-01-07 | Stop reason: HOSPADM

## 2021-01-07 RX ORDER — ONDANSETRON 2 MG/ML
INJECTION INTRAMUSCULAR; INTRAVENOUS AS NEEDED
Status: DISCONTINUED | OUTPATIENT
Start: 2021-01-07 | End: 2021-01-07 | Stop reason: HOSPADM

## 2021-01-07 RX ORDER — DEXAMETHASONE SODIUM PHOSPHATE 4 MG/ML
INJECTION, SOLUTION INTRA-ARTICULAR; INTRALESIONAL; INTRAMUSCULAR; INTRAVENOUS; SOFT TISSUE AS NEEDED
Status: DISCONTINUED | OUTPATIENT
Start: 2021-01-07 | End: 2021-01-07 | Stop reason: HOSPADM

## 2021-01-07 RX ORDER — ROPIVACAINE HYDROCHLORIDE 2 MG/ML
INJECTION, SOLUTION EPIDURAL; INFILTRATION; PERINEURAL
Status: COMPLETED | OUTPATIENT
Start: 2021-01-07 | End: 2021-01-07

## 2021-01-07 RX ORDER — SODIUM CHLORIDE, SODIUM LACTATE, POTASSIUM CHLORIDE, CALCIUM CHLORIDE 600; 310; 30; 20 MG/100ML; MG/100ML; MG/100ML; MG/100ML
75 INJECTION, SOLUTION INTRAVENOUS CONTINUOUS
Status: DISCONTINUED | OUTPATIENT
Start: 2021-01-07 | End: 2021-01-07 | Stop reason: HOSPADM

## 2021-01-07 RX ORDER — AMOXICILLIN 250 MG
2 CAPSULE ORAL DAILY
Status: DISCONTINUED | OUTPATIENT
Start: 2021-01-08 | End: 2021-01-08 | Stop reason: HOSPADM

## 2021-01-07 RX ORDER — ACETAMINOPHEN 650 MG/1
650 SUPPOSITORY RECTAL ONCE
Status: DISCONTINUED | OUTPATIENT
Start: 2021-01-07 | End: 2021-01-07

## 2021-01-07 RX ORDER — OXYCODONE HYDROCHLORIDE 5 MG/1
5-10 TABLET ORAL
Status: DISCONTINUED | OUTPATIENT
Start: 2021-01-07 | End: 2021-01-08 | Stop reason: HOSPADM

## 2021-01-07 RX ORDER — HYDROMORPHONE HYDROCHLORIDE 1 MG/ML
1 INJECTION, SOLUTION INTRAMUSCULAR; INTRAVENOUS; SUBCUTANEOUS
Status: DISCONTINUED | OUTPATIENT
Start: 2021-01-07 | End: 2021-01-08 | Stop reason: HOSPADM

## 2021-01-07 RX ORDER — DEXAMETHASONE SODIUM PHOSPHATE 100 MG/10ML
10 INJECTION INTRAMUSCULAR; INTRAVENOUS ONCE
Status: DISCONTINUED | OUTPATIENT
Start: 2021-01-08 | End: 2021-01-08 | Stop reason: HOSPADM

## 2021-01-07 RX ORDER — DEXAMETHASONE SODIUM PHOSPHATE 4 MG/ML
INJECTION, SOLUTION INTRA-ARTICULAR; INTRALESIONAL; INTRAMUSCULAR; INTRAVENOUS; SOFT TISSUE
Status: COMPLETED | OUTPATIENT
Start: 2021-01-07 | End: 2021-01-07

## 2021-01-07 RX ORDER — ASPIRIN 81 MG/1
81 TABLET ORAL EVERY 12 HOURS
Status: DISCONTINUED | OUTPATIENT
Start: 2021-01-07 | End: 2021-01-08 | Stop reason: HOSPADM

## 2021-01-07 RX ORDER — PROPOFOL 10 MG/ML
INJECTION, EMULSION INTRAVENOUS AS NEEDED
Status: DISCONTINUED | OUTPATIENT
Start: 2021-01-07 | End: 2021-01-07 | Stop reason: HOSPADM

## 2021-01-07 RX ORDER — CELECOXIB 200 MG/1
200 CAPSULE ORAL EVERY 12 HOURS
Status: DISCONTINUED | OUTPATIENT
Start: 2021-01-07 | End: 2021-01-08 | Stop reason: HOSPADM

## 2021-01-07 RX ORDER — NALOXONE HYDROCHLORIDE 0.4 MG/ML
.2-.4 INJECTION, SOLUTION INTRAMUSCULAR; INTRAVENOUS; SUBCUTANEOUS
Status: DISCONTINUED | OUTPATIENT
Start: 2021-01-07 | End: 2021-01-08 | Stop reason: HOSPADM

## 2021-01-07 RX ORDER — LIDOCAINE HYDROCHLORIDE 10 MG/ML
0.1 INJECTION INFILTRATION; PERINEURAL AS NEEDED
Status: DISCONTINUED | OUTPATIENT
Start: 2021-01-07 | End: 2021-01-07 | Stop reason: HOSPADM

## 2021-01-07 RX ORDER — CEFAZOLIN SODIUM/WATER 2 G/20 ML
2 SYRINGE (ML) INTRAVENOUS EVERY 8 HOURS
Status: COMPLETED | OUTPATIENT
Start: 2021-01-07 | End: 2021-01-08

## 2021-01-07 RX ORDER — SODIUM CHLORIDE 0.9 % (FLUSH) 0.9 %
5-40 SYRINGE (ML) INJECTION AS NEEDED
Status: DISCONTINUED | OUTPATIENT
Start: 2021-01-07 | End: 2021-01-08 | Stop reason: HOSPADM

## 2021-01-07 RX ORDER — ACETAMINOPHEN 500 MG
1000 TABLET ORAL ONCE
Status: COMPLETED | OUTPATIENT
Start: 2021-01-07 | End: 2021-01-07

## 2021-01-07 RX ORDER — PROMETHAZINE HYDROCHLORIDE 25 MG/1
25 TABLET ORAL
Status: DISCONTINUED | OUTPATIENT
Start: 2021-01-07 | End: 2021-01-08 | Stop reason: HOSPADM

## 2021-01-07 RX ORDER — ROPIVACAINE HYDROCHLORIDE 2 MG/ML
INJECTION, SOLUTION EPIDURAL; INFILTRATION; PERINEURAL AS NEEDED
Status: DISCONTINUED | OUTPATIENT
Start: 2021-01-07 | End: 2021-01-07 | Stop reason: HOSPADM

## 2021-01-07 RX ORDER — DIPHENHYDRAMINE HCL 25 MG
25 CAPSULE ORAL
Status: DISCONTINUED | OUTPATIENT
Start: 2021-01-07 | End: 2021-01-08 | Stop reason: HOSPADM

## 2021-01-07 RX ORDER — KETOROLAC TROMETHAMINE 30 MG/ML
INJECTION, SOLUTION INTRAMUSCULAR; INTRAVENOUS AS NEEDED
Status: DISCONTINUED | OUTPATIENT
Start: 2021-01-07 | End: 2021-01-07 | Stop reason: HOSPADM

## 2021-01-07 RX ORDER — SODIUM CHLORIDE 9 MG/ML
100 INJECTION, SOLUTION INTRAVENOUS CONTINUOUS
Status: DISCONTINUED | OUTPATIENT
Start: 2021-01-07 | End: 2021-01-08 | Stop reason: HOSPADM

## 2021-01-07 RX ORDER — MIDAZOLAM HYDROCHLORIDE 1 MG/ML
2 INJECTION, SOLUTION INTRAMUSCULAR; INTRAVENOUS ONCE
Status: COMPLETED | OUTPATIENT
Start: 2021-01-07 | End: 2021-01-07

## 2021-01-07 RX ORDER — FENTANYL CITRATE 50 UG/ML
100 INJECTION, SOLUTION INTRAMUSCULAR; INTRAVENOUS AS NEEDED
Status: COMPLETED | OUTPATIENT
Start: 2021-01-07 | End: 2021-01-07

## 2021-01-07 RX ORDER — EPHEDRINE SULFATE/0.9% NACL/PF 50 MG/5 ML
SYRINGE (ML) INTRAVENOUS AS NEEDED
Status: DISCONTINUED | OUTPATIENT
Start: 2021-01-07 | End: 2021-01-07 | Stop reason: HOSPADM

## 2021-01-07 RX ORDER — FLUMAZENIL 0.1 MG/ML
0.2 INJECTION INTRAVENOUS
Status: DISCONTINUED | OUTPATIENT
Start: 2021-01-07 | End: 2021-01-07 | Stop reason: HOSPADM

## 2021-01-07 RX ORDER — SODIUM CHLORIDE, SODIUM LACTATE, POTASSIUM CHLORIDE, CALCIUM CHLORIDE 600; 310; 30; 20 MG/100ML; MG/100ML; MG/100ML; MG/100ML
100 INJECTION, SOLUTION INTRAVENOUS CONTINUOUS
Status: DISCONTINUED | OUTPATIENT
Start: 2021-01-07 | End: 2021-01-07 | Stop reason: HOSPADM

## 2021-01-07 RX ORDER — HYDROMORPHONE HYDROCHLORIDE 2 MG/ML
0.5 INJECTION, SOLUTION INTRAMUSCULAR; INTRAVENOUS; SUBCUTANEOUS
Status: DISCONTINUED | OUTPATIENT
Start: 2021-01-07 | End: 2021-01-07 | Stop reason: HOSPADM

## 2021-01-07 RX ORDER — SODIUM CHLORIDE 0.9 % (FLUSH) 0.9 %
5-40 SYRINGE (ML) INJECTION EVERY 8 HOURS
Status: DISCONTINUED | OUTPATIENT
Start: 2021-01-07 | End: 2021-01-08 | Stop reason: HOSPADM

## 2021-01-07 RX ORDER — PROPOFOL 10 MG/ML
INJECTION, EMULSION INTRAVENOUS
Status: DISCONTINUED | OUTPATIENT
Start: 2021-01-07 | End: 2021-01-07 | Stop reason: HOSPADM

## 2021-01-07 RX ORDER — SODIUM CHLORIDE, SODIUM LACTATE, POTASSIUM CHLORIDE, CALCIUM CHLORIDE 600; 310; 30; 20 MG/100ML; MG/100ML; MG/100ML; MG/100ML
INJECTION, SOLUTION INTRAVENOUS
Status: DISCONTINUED | OUTPATIENT
Start: 2021-01-07 | End: 2021-01-07 | Stop reason: HOSPADM

## 2021-01-07 RX ORDER — FENTANYL CITRATE 50 UG/ML
INJECTION, SOLUTION INTRAMUSCULAR; INTRAVENOUS AS NEEDED
Status: DISCONTINUED | OUTPATIENT
Start: 2021-01-07 | End: 2021-01-07 | Stop reason: HOSPADM

## 2021-01-07 RX ORDER — OXYCODONE HYDROCHLORIDE 5 MG/1
5 TABLET ORAL
Status: DISCONTINUED | OUTPATIENT
Start: 2021-01-07 | End: 2021-01-07 | Stop reason: HOSPADM

## 2021-01-07 RX ORDER — DIPHENHYDRAMINE HYDROCHLORIDE 50 MG/ML
12.5 INJECTION, SOLUTION INTRAMUSCULAR; INTRAVENOUS
Status: DISCONTINUED | OUTPATIENT
Start: 2021-01-07 | End: 2021-01-07 | Stop reason: HOSPADM

## 2021-01-07 RX ORDER — NALOXONE HYDROCHLORIDE 0.4 MG/ML
0.1 INJECTION, SOLUTION INTRAMUSCULAR; INTRAVENOUS; SUBCUTANEOUS AS NEEDED
Status: DISCONTINUED | OUTPATIENT
Start: 2021-01-07 | End: 2021-01-07 | Stop reason: HOSPADM

## 2021-01-07 RX ADMIN — ACETAMINOPHEN 1000 MG: 500 TABLET ORAL at 16:25

## 2021-01-07 RX ADMIN — SODIUM CHLORIDE, SODIUM LACTATE, POTASSIUM CHLORIDE, AND CALCIUM CHLORIDE: 600; 310; 30; 20 INJECTION, SOLUTION INTRAVENOUS at 12:06

## 2021-01-07 RX ADMIN — PHENYLEPHRINE HYDROCHLORIDE 50 MCG: 10 INJECTION INTRAVENOUS at 11:19

## 2021-01-07 RX ADMIN — MIDAZOLAM 2 MG: 1 INJECTION INTRAMUSCULAR; INTRAVENOUS at 10:14

## 2021-01-07 RX ADMIN — PHENYLEPHRINE HYDROCHLORIDE 50 MCG: 10 INJECTION INTRAVENOUS at 11:13

## 2021-01-07 RX ADMIN — Medication 81 MG: at 19:58

## 2021-01-07 RX ADMIN — PROPOFOL 100 MCG/KG/MIN: 10 INJECTION, EMULSION INTRAVENOUS at 10:53

## 2021-01-07 RX ADMIN — PHENYLEPHRINE HYDROCHLORIDE 150 MCG: 10 INJECTION INTRAVENOUS at 12:04

## 2021-01-07 RX ADMIN — OXYCODONE 10 MG: 5 TABLET ORAL at 16:23

## 2021-01-07 RX ADMIN — Medication 2 G: at 10:57

## 2021-01-07 RX ADMIN — PHENYLEPHRINE HYDROCHLORIDE 100 MCG: 10 INJECTION INTRAVENOUS at 11:28

## 2021-01-07 RX ADMIN — Medication 3 AMPULE: at 08:52

## 2021-01-07 RX ADMIN — PHENYLEPHRINE HYDROCHLORIDE 50 MCG: 10 INJECTION INTRAVENOUS at 12:15

## 2021-01-07 RX ADMIN — TRANEXAMIC ACID 1000 MG: 100 INJECTION, SOLUTION INTRAVENOUS at 11:03

## 2021-01-07 RX ADMIN — Medication 5 MG: at 12:20

## 2021-01-07 RX ADMIN — PHENYLEPHRINE HYDROCHLORIDE 50 MCG: 10 INJECTION INTRAVENOUS at 11:08

## 2021-01-07 RX ADMIN — PROPOFOL 30 MG: 10 INJECTION, EMULSION INTRAVENOUS at 10:57

## 2021-01-07 RX ADMIN — ACETAMINOPHEN 1000 MG: 500 TABLET ORAL at 08:52

## 2021-01-07 RX ADMIN — Medication 1 AMPULE: at 19:59

## 2021-01-07 RX ADMIN — DEXAMETHASONE SODIUM PHOSPHATE 8 MG: 4 INJECTION, SOLUTION INTRAMUSCULAR; INTRAVENOUS at 11:05

## 2021-01-07 RX ADMIN — ROPIVACAINE HYDROCHLORIDE 40 MG: 2 INJECTION, SOLUTION EPIDURAL; INFILTRATION at 10:16

## 2021-01-07 RX ADMIN — DEXAMETHASONE SODIUM PHOSPHATE 4 MG: 4 INJECTION, SOLUTION INTRAMUSCULAR; INTRAVENOUS at 10:16

## 2021-01-07 RX ADMIN — MEPIVACAINE HYDROCHLORIDE 60 MG: 20 INJECTION, SOLUTION EPIDURAL; INFILTRATION at 10:50

## 2021-01-07 RX ADMIN — SODIUM CHLORIDE, SODIUM LACTATE, POTASSIUM CHLORIDE, AND CALCIUM CHLORIDE 100 ML/HR: 600; 310; 30; 20 INJECTION, SOLUTION INTRAVENOUS at 08:52

## 2021-01-07 RX ADMIN — LIDOCAINE HYDROCHLORIDE 0.1 ML: 10 INJECTION, SOLUTION INFILTRATION; PERINEURAL at 08:52

## 2021-01-07 RX ADMIN — FENTANYL CITRATE 50 MCG: 50 INJECTION, SOLUTION INTRAMUSCULAR; INTRAVENOUS at 10:14

## 2021-01-07 RX ADMIN — SODIUM CHLORIDE, SODIUM LACTATE, POTASSIUM CHLORIDE, AND CALCIUM CHLORIDE: 600; 310; 30; 20 INJECTION, SOLUTION INTRAVENOUS at 10:15

## 2021-01-07 RX ADMIN — CELECOXIB 200 MG: 200 CAPSULE ORAL at 19:59

## 2021-01-07 RX ADMIN — ONDANSETRON 4 MG: 2 INJECTION INTRAMUSCULAR; INTRAVENOUS at 12:07

## 2021-01-07 RX ADMIN — PROPOFOL 20 MG: 10 INJECTION, EMULSION INTRAVENOUS at 10:58

## 2021-01-07 RX ADMIN — FENTANYL CITRATE 25 MCG: 50 INJECTION INTRAMUSCULAR; INTRAVENOUS at 12:03

## 2021-01-07 RX ADMIN — LIDOCAINE HYDROCHLORIDE 50 MG: 20 INJECTION, SOLUTION EPIDURAL; INFILTRATION; INTRACAUDAL; PERINEURAL at 10:52

## 2021-01-07 RX ADMIN — Medication 2 G: at 20:00

## 2021-01-07 NOTE — PROGRESS NOTES
Pt up in recliner tolerating dinner well. Appetite good. Pain to knee controlled well. Voiding. N/V checks WNLS. Call light in reach.

## 2021-01-07 NOTE — ANESTHESIA PROCEDURE NOTES
Peripheral Block    Start time: 1/7/2021 10:16 AM  End time: 1/7/2021 10:17 AM  Performed by: Yojana Collins MD  Authorized by: Yojana Collins MD       Pre-procedure: Indications: at surgeon's request and post-op pain management    Preanesthetic Checklist: patient identified, risks and benefits discussed, site marked, timeout performed, anesthesia consent given and patient being monitored    Timeout Time: 10:16          Block Type:   Block Type:   Adductor canal  Laterality:  Left  Monitoring:  Standard ASA monitoring, continuous pulse ox, heart rate, responsive to questions, oxygen and frequent vital sign checks  Injection Technique:  Single shot  Procedures: ultrasound guided    Patient Position: supine  Prep: chlorhexidine    Location:  Mid thigh  Needle Type:  Stimuplex  Needle Gauge:  20 G  Needle Localization:  Ultrasound guidance  Medication Injected:  Ropivacaine (NAROPIN) 2 mg/mL (0.2 %) injection, 40 mg  dexamethasone (DECADRON) 4 mg/mL injection, 4 mg  Med Admin Time: 1/7/2021 10:16 AM    Assessment:  Number of attempts:  1  Injection Assessment:  Incremental injection every 5 mL, local visualized surrounding nerve on ultrasound, negative aspiration for blood, no intravascular symptoms, no paresthesia and ultrasound image on chart  Patient tolerance:  Patient tolerated the procedure well with no immediate complications

## 2021-01-07 NOTE — OP NOTES
[facility] Assisted CementedTotal Knee Arthroplasty -   Patient:Liana Lucia   : 1960  Medical Record Number:[id number]  Pre-operative Diagnosis:  Primary osteoarthritis of left knee [M17.12]  Post-operative Diagnosis: Primary osteoarthritis of left knee     Surgeon: Prerna Box MD  Assistant: Petar Velez PA-C    Anesthesia: Spinal    Procedure: Total Knee Arthroplasty   The complexity of the total joint surgery requires the use of a first assistant for positioning, retraction and assistance in closure. The patient's Body mass index is 32.92 kg/m²., BMI's greater then 40 make surgical exposure and retraction extremely difficult and increase operative time. Tourniquet Time: none  EBL: 150cc  Additional Findings: Severe DJD/ Pre-op ROM -/ Post-op 0-125  Releases none    Cari Salcedo was brought to the operating room and positioned on the operating table. She was anethestized  IV antibiotics were administered per CMS protocol. Prior to the incision being made a timeout was called identifying the patient, procedure ,operative side and surgeon. The left leg was prepped and draped in the usual sterile manner  An anterior longitudinal incision was accomplished just medial to the tibial tubercle and extending approximal 6 centimeters proximal to the superior pole of the patella. A medial parapatellar capsular incision was performed. The medial capsular flap was elevated around to the insertion of the semimembranous tendon. The patella was everted and the knee flexed and externally rotated. The medial and external menisci were excised. The lateral half of the fat pad excised and the patella femoral ligament was released. The anterior cruciate ligament was resected and the posterior cruciate ligament was retained. The Iraj arrays were placed in the distal femur and proximal tibia per protocol and the knee was registered.  Confirmation of registration with the pre-op CT scan and surgical plan was made. The knee was balanced with tensioners as part of this process. The tibia and femur were then prepared via the [facility] system with robotic assistance. A preliminary range of motion was accomplished with the above size trial components. A polyethylene insert allowed the patient to obtain full extension as well as appropriate flexion. The patient's ligaments were stable in flexion and extension to medial and lateral stressing and the alignment was through the appropriate mechanical axis. The tibial base plate was pinned into place with the appropriate external rotation and stem site prepared. The patella was then everted. Given grade 3-4 chondromalacia, the patella was resurfaced with a cemented all-poly patella. All trial components were removed and the implants were cemented into position and pressurized. The betadine lavage protocol was performed. The operative knee was injected with 60cc of Naropin, 10 cc's of morphine and 1 cc of 30mg of Toradol. The capsular layer was closed using a #1 vicryl suture, while subcutaneous layers were closed using 2-0 Vicryl interrupted sutures and a #1 Stratofix. Finally the skin was closed using 3-0 Vicryl and a Zipline closure. A sterile sterile bandage was applied. An Iceman cryo pad was applied on the operative leg. Sponge count and needle counts were correct. Shahzad Croft left the operating room     Implants:   Implant Name Type Inv.  Item Serial No.  Lot No. LRB No. Used Action   CEMENT BNE BIOMET HV [id number] --  - [id number]  CEMENT BNE BIOMET HV [id number] --  [id number] [facility]_ [id number] Left 2 Implanted   FEM KNE UMAIR CR SZ 4 LT -- TRIATHLON - [id number]  FEM KNE UMAIR CR SZ 4 LT -- TRIATHLON [id number] [employer] [facility]_ [id number] Left 1 Implanted   BASEPLT TIB UNIV TRIATHLN 5 --  - [id number]  BASEPLT TIB UNIV TRIATHLN 5 --  [id number] [employer] [facility]_ [id number] Left 1 Implanted   INSERT TIB SZ 5 [id number] UNIV KNEE POLYETH CNDYL STBL AZAEL - [id number]  INSERT TIB SZ 5 [id number] UNIV KNEE POLYETH CNDYL STBL AZAEL [id number] [employer] HOW_WD [id number] Left 1 Wasted   COMPONENT PAT [id number] SUP INFERIOR ASYM TRIATHLON - [id number]  COMPONENT PAT [id number] SUP INFERIOR ASYM TRIATHLON [id number] [employer] HOW_WD [id number] Left 1 Implanted   INSERT TIB CS 5 12 MM ARTC KNEE BEAR TECHNOLOGY TRIATHLON - [id number]  INSERT TIB CS 5 12 MM ARTC KNEE BEAR TECHNOLOGY TRIATHLON [id number] [employer] HOW_WD [id number] Left 1 Implanted     Signed By: Eleno Vail MD

## 2021-01-07 NOTE — ANESTHESIA PREPROCEDURE EVALUATION
Relevant Problems   No relevant active problems       Anesthetic History   No history of anesthetic complications            Review of Systems / Medical History  Patient summary reviewed and pertinent labs reviewed    Pulmonary  Within defined limits                 Neuro/Psych   Within defined limits           Cardiovascular              Hyperlipidemia    Exercise tolerance: >4 METS: Denied chest pain, SOB, syncope   Comments: Varicose veins   GI/Hepatic/Renal  Within defined limits              Endo/Other        Obesity and arthritis     Other Findings   Comments: Hb 13.8  Plts 336  Coags WNL           Physical Exam    Airway  Mallampati: II  TM Distance: 4 - 6 cm  Neck ROM: normal range of motion   Mouth opening: Normal     Cardiovascular    Rhythm: regular  Rate: normal      Pertinent negatives: No murmur   Dental  No notable dental hx       Pulmonary  Breath sounds clear to auscultation               Abdominal  GI exam deferred       Other Findings            Anesthetic Plan    ASA: 2  Anesthesia type: spinal      Post-op pain plan if not by surgeon: peripheral nerve block single      Anesthetic plan and risks discussed with: Patient

## 2021-01-07 NOTE — PROGRESS NOTES
TRANSFER - IN REPORT:    Verbal report received from PACU(name) on Liana Bhakta  being received from PACU(unit) for routine progression of care      Report consisted of patient’s Situation, Background, Assessment and   Recommendations(SBAR).     Information from the following report(s) SBAR, Kardex, Procedure Summary, Intake/Output, MAR and Recent Results was reviewed with the receiving nurse.    Opportunity for questions and clarification was provided.      Assessment completed upon patient’s arrival to unit and care assumed.

## 2021-01-07 NOTE — INTERVAL H&P NOTE
Update History & Physical 
 
The Patient's History and Physical of January 4, 2021 was reviewed with the patient and I examined the patient. There was no change. The surgical site was confirmed by the patient and me. Plan:  The risk, benefits, expected outcome, and alternative to the recommended procedure have been discussed with the patient. Patient understands and wants to proceed with the procedure.  
 
Electronically signed by HAYDEN Whatley on 1/7/2021 at 10:21 AM

## 2021-01-07 NOTE — PROGRESS NOTES
01/07/21 1607   Oxygen Therapy   O2 Sat (%) 95 %   Pulse via Oximetry 72 beats per minute   O2 Device Room air   O2 Flow Rate (L/min) 0 l/min   Incentive Spirometry Treatment   Actual Volume (ml) 1500 ml   Number of Attempts 3   Patient achieved  1500    Ml/sec on IS. Patient encouraged to do every hour while awake-patient agreed and demonstrated. No shortness of breath or distress noted. BS are clear b/l.    [facility] notes reviewed- continuous sat  ordered HS

## 2021-01-07 NOTE — PERIOP NOTES
TRANSFER - OUT REPORT:    Verbal report given to WALDO Newell on Rafita Negro  being transferred to [facility] [phone] for routine post - op       Report consisted of patients Situation, Background, Assessment and   Recommendations(SBAR). Information from the following report(s) SBAR, Procedure Summary, Intake/Output, Cardiac Rhythm NSR/ sinus sandor, Procedure Verification and Quality Measures was reviewed with the receiving nurse. Lines:   Peripheral IV 01/07/21 Left Hand (Active)   Site Assessment Clean, dry, & intact 01/07/21 1315   Phlebitis Assessment 0 01/07/21 1315   Infiltration Assessment 0 01/07/21 1315   Dressing Status Clean, dry, & intact 01/07/21 1315   Dressing Type Tape;Transparent 01/07/21 1315   Hub Color/Line Status Pink; Infusing;Patent 01/07/21 1315   Action Taken Blood drawn 01/07/21 0844        Opportunity for questions and clarification was provided.       Patient transported with:   [employer]

## 2021-01-07 NOTE — PROGRESS NOTES
Problem: Mobility Impaired (Adult and Pediatric)  Goal: *Acute Goals and Plan of Care (Insert Text)  Outcome: Progressing Towards Goal  Note: GOALS (1-4 days):  (1.)Ms. Bennie Willard will move from supine to sit and sit to supine  in bed with STAND BY ASSIST.  (2.)Ms. Bennie Willard will transfer from bed to chair and chair to bed with STAND BY ASSIST using the least restrictive device. (3.)Ms. Bennie Willard will ambulate with STAND BY ASSIST for 200 feet with the least restrictive device. (4.)Ms. Bennie Willard will ambulate up/down 3 steps with bilateral  railing with CONTACT GUARD ASSIST with no device. (5.)Ms. Bennie Willard will increase left knee ROM to 5°-80°.  ________________________________________________________________________________________________      PHYSICAL THERAPY JOINT CAMP TKA: Initial Assessment 1/7/2021  INPATIENT: Hospital Day: 1  Payor: Nati Cherry / Plan: [place] BLUE CROSS OUT OF STATE / Product Type: PPO /      NAME/AGE/GENDER: Shahzad Croft is a 56 y.o. female   PRIMARY DIAGNOSIS:  Primary osteoarthritis of left knee [M17.12]   Procedure(s) and Anesthesia Type:     * LEFT KNEE ARTHROPLASTY TOTAL ROBOTIC ASSISTED DAYA/JUNIOR - Spinal (Left)  ICD-10: Treatment Diagnosis:    · Pain in Left Knee (M25.562)  · Stiffness of Left Knee, Not elsewhere classified (M25.662)  · Difficulty in walking, Not elsewhere classified (R26.2)  · Other abnormalities of gait and mobility (R26.89)      ASSESSMENT:     Ms. Bennie Willard presents with decreased strength and ROM L LE and limited functional mobility S/P L TKA. Plans to discharge home with support of spouse. She will benefit from skilled therapy services to address the below problem list.     This section established at most recent assessment   PROBLEM LIST (Impairments causing functional limitations):  1. Decreased Strength  2. Decreased ADL/Functional Activities  3. Decreased Transfer Abilities  4. Decreased Ambulation Ability/Technique  5. Decreased Flexibility/Joint Mobility  6.  Decreased [place] with Home Exercise Program   INTERVENTIONS PLANNED: (Benefits and precautions of physical therapy have been discussed with the patient.)  1. Bed Mobility  2. Cold  3. Gait Training  4. Home Exercise Program (HEP)  5. Range of Motion (ROM)  6. Therapeutic Activites  7. Therapeutic Exercise/Strengthening  8. Transfer Training     TREATMENT PLAN: Frequency/Duration: Follow patient BID for duration of hospital stay to address above goals. Rehabilitation Potential For Stated Goals: Good     RECOMMENDED REHABILITATION/EQUIPMENT: (at time of discharge pending progress): Continue Skilled Therapy and Home Health: Physical Therapy. HISTORY:   History of Present Injury/Illness (Reason for Referral):  S/P L TKA  Past Medical History/Comorbidities:   Ms. Bassem Boyd  has a past medical history of Arthritis, Constipation, High cholesterol, Postmenopausal, and Varicose vein of leg. Ms. Bassem Boyd  has a past surgical history that includes hx hysterectomy (2002); hx breast biopsy (Left, 03/18/2013); hx cholecystectomy (12/19/2011); hx orthopaedic (Left, 03/20/2014); and hx knee arthroscopy (Left, 2005).   Social History/Living Environment:   Home Environment: Private residence  # Steps to Enter: 3  Rails to Enter: Yes  Hand Rails : Bilateral  One/Two Story Residence: Two story, live on 1st floor  # of Interior Steps: 12  Interior Rails: Left  Living Alone: No  Support Systems: Spouse/Significant Other/Partner  Patient Expects to be Discharged to[de-identified] Private residence  Current DME Used/Available at Home: Lyndle [place], straight, Shower chair, Walker, rolling  Tub or Shower Type: Tub/Shower combination  Prior Level of Function/Work/Activity:  Independent   Number of Personal Factors/Comorbidities that affect the Plan of Care: 0: LOW COMPLEXITY   EXAMINATION:   Most Recent Physical Functioning:      Gross Assessment  AROM: Within functional limits(except L knee; S/P L TKA)  Strength: Generally decreased, functional          LLE AROM  L Knee Flexion: 60(~ post op)  L Knee Extension: -15(~ post op)          Bed Mobility  Supine to Sit: Stand-by assistance;Contact guard assistance  Scooting: Stand-by assistance;Contact guard assistance    Transfers  Sit to Stand: Contact guard assistance  Stand to Sit: Contact guard assistance  Bed to Chair: Contact guard assistance  Toilet Transfers : Contact guard assistance    Balance  Sitting: Intact  Standing: Intact;Pull to stand              Weight Bearing Status  Left Side Weight Bearing: As tolerated  Distance (ft): 15 Feet (ft)  Ambulation - Level of Assistance: Contact guard assistance  Assistive Device: Walker, rolling  Speed/Opal: Slow  Stance: Left decreased  Gait Abnormalities: Antalgic;Decreased step clearance  Interventions: Manual cues; Safety awareness training;Verbal cues     Braces/Orthotics: none    Left Knee Cold  Type: Cryocuff      Body Structures Involved:  1. Bones  2. Joints  3. Muscles Body Functions Affected:  1. Neuromusculoskeletal  2. Movement Related Activities and Participation Affected:  1. General Tasks and Demands  2. Mobility   Number of elements that affect the Plan of Care: 3: MODERATE COMPLEXITY   CLINICAL PRESENTATION:   Presentation: Stable and uncomplicated: LOW COMPLEXITY   CLINICAL DECISION MAKING:   Sylvester Man 6 Clicks   Basic Mobility Inpatient Short Form  How much difficulty does the patient currently have. .. Unable A Lot A Little None   1. Turning over in bed (including adjusting bedclothes, sheets and blankets)? [] 1   [] 2   [] 3   [x] 4   2. Sitting down on and standing up from a chair with arms ( e.g., wheelchair, bedside commode, etc.)   [] 1   [] 2   [x] 3   [] 4   3. Moving from lying on back to sitting on the side of the bed? [] 1   [] 2   [x] 3   [] 4   How much help from another person does the patient currently need. .. Total A Lot A Little None   4. Moving to and from a bed to a chair (including a wheelchair)?   [] 1   [] 2   [x] 3   [] 4   5.  Need to walk in hospital room?   [] 1   [] 2   [x] 3   [] 4   6.  Climbing 3-5 steps with a railing?   [] 1   [] 2   [x] 3   [] 4   © 2007, Trustees of [facility], under license to [employer]. All rights reserved     Score:  Initial: 19 Most Recent: X (Date: -- )    Interpretation of Tool:  Represents activities that are increasingly more difficult (i.e. Bed mobility, Transfers, Gait).    Medical Necessity:     · Patient demonstrates   · good  ·  rehab potential due to higher previous functional level.  Reason for Services/Other Comments:  · Patient   · continues to require present interventions due to patient's inability to perform exercises and functional mobility independently  · .   Use of outcome tool(s) and clinical judgement create a POC that gives a: Clear prediction of patient's progress: LOW COMPLEXITY            TREATMENT:   (In addition to Assessment/Re-Assessment sessions the following treatments were rendered)     Pre-treatment Symptoms/Complaints:  L knee pain  Pain Initial:   Pain Intensity 1: 4  Pain Location 1: Knee  Pain Orientation 1: Left  Pain Intervention(s) 1: Cold pack, Repositioned  Post Session:  4     Therapeutic Exercise: (8 Minutes):  Exercises per grid below to improve mobility and strength.  Required minimal verbal cues to  perform exercises correctly .       Date:  1/7/21 Date:   Date:     ACTIVITY/EXERCISE AM PM AM PM AM PM   GROUP THERAPY  []  []  []  []  []  []   Ankle Pumps  10       Quad Sets  10       Gluteal Sets  10       Hip ABd/ADduction  10       Straight Leg Raises         Knee Slides  10 AA       Short Arc Quads  10 AA       Long Arc Quads         Chair Slides                  B = bilateral; AA = active assistive; A = active; P = passive      Treatment/Session Assessment:     Response to Treatment:  tolerated well.    Education:  [x] Home Exercises  [x] Fall Precautions  [x] Use of Cold Therapy Unit [] D/C Instruction Review  [] Knee  Prosthesis Review  [x] [place] Management/Safety [] Adaptive Equipment as Needed       Interdisciplinary Collaboration:   o Physical Therapist  o Occupational Therapist  o Registered Nurse    After treatment position/precautions:   o Up in chair  o Bed/Chair-wheels locked  o Call light within reach  o RN notified    Compliance with Program/Exercises: Compliant most of the time, Will assess as treatment progresses. Recommendations/Intent for next treatment session:  Treatment next visit will focus on increasing Ms. Bhakta's independence with bed mobility, transfers, gait training, strength/ROM exercises, modalities for pain, and patient education.       Total Treatment Duration:  PT Patient Time In/Time Out  Time In: 1550  Time Out: 5[address], PT

## 2021-01-07 NOTE — ANESTHESIA PROCEDURE NOTES
Spinal Block    Start time: 1/7/2021 10:48 AM  End time: 1/7/2021 10:51 AM  Performed by: Tennille Lawson MD  Authorized by: Tennille Lawson MD     Pre-procedure:   Indications: at surgeon's request and primary anesthetic  Preanesthetic Checklist: patient identified, risks and benefits discussed, anesthesia consent, site marked, patient being monitored and timeout performed    Timeout Time: 10:48          Spinal Block:   Patient Position:  Seated  Prep Region:  Lumbar  Prep: chlorhexidine      Location:  L3-4  Technique:  Single shot        Needle:   Needle Type:  Pencan  Needle Gauge:  25 G  Attempts:  1      Events: CSF confirmed, no blood with aspiration and no paresthesia        Assessment:  Insertion:  Uncomplicated  Patient tolerance:  Patient tolerated the procedure well with no immediate complications

## 2021-01-07 NOTE — ANESTHESIA POSTPROCEDURE EVALUATION
Procedure(s):  LEFT KNEE ARTHROPLASTY TOTAL ROBOTIC ASSISTED DAYA/JUNIOR.    spinal, total IV anesthesia    Anesthesia Post Evaluation      Multimodal analgesia: multimodal analgesia used between 6 hours prior to anesthesia start to PACU discharge  Patient location during evaluation: PACU  Patient participation: complete - patient participated  Level of consciousness: awake and alert  Pain management: adequate  Airway patency: patent  Anesthetic complications: no  Cardiovascular status: acceptable  Respiratory status: acceptable  Hydration status: acceptable  Post anesthesia nausea and vomiting:  controlled  Final Post Anesthesia Temperature Assessment:  Normothermia (36.0-37.5 degrees C)      INITIAL Post-op Vital signs:   Vitals Value Taken Time   BP 94/63 01/07/21 1310   Temp 36.1 °C (97 °F) 01/07/21 1242   Pulse 53 01/07/21 1310   Resp 16 01/07/21 1310   SpO2 97 % 01/07/21 1310

## 2021-01-07 NOTE — PROGRESS NOTES
Care Management Interventions  PCP Verified by CM: Yes  Mode of Transport at Discharge: Self  Transition of Care Consult (CM Consult): [address]: Yes  Discharge Durable Medical Equipment: No  Physical Therapy Consult: Yes  Occupational Therapy Consult: Yes  Current Support Network: Lives with Spouse  Confirm Follow Up Transport: Family  Discharge Location  Discharge Placement: Home with home health    Patient is a 56y.o. year old female admitted for Left TKA . Patient plans to return home on discharge. Order received to arrange home health. Patient without preference towards agency. Referral sent to [facility]. Patient denies any equipment needs as patient has a walker and raised toilet seat. Will follow until discharge.

## 2021-01-07 NOTE — PROGRESS NOTES
Problem: Self Care Deficits Care Plan (Adult)  Goal: *Acute Goals and Plan of Care (Insert Text)  Description: GOALS:   DISCHARGE GOALS (in preparation for going home/rehab):  3 days  1. Ms. Davey Robertson will perform one lower body dressing activity with minimal assistance required to demonstrate improved functional mobility and safety. 2.  Ms. Davey Robertson will perform one lower body bathing activity with minimal assistance required to demonstrate improved functional mobility and safety. 3.  Ms. Davey Robertson will perform toileting/toilet transfer with contact guard assistance to demonstrate improved functional mobility and safety. 4.  Ms. Davey Robertson will perform shower transfer with contact guard assistance to demonstrate improved functional mobility and safety. Outcome: Progressing Towards Goal    JOINT CAMP OCCUPATIONAL THERAPY TKA: Initial Assessment, Daily Note, Treatment Day: Day of Assessment, and PM 1/7/2021  INPATIENT: Hospital Day: 1  Payor: Ko [place] / Plan: [facility] / Product Type: PPO /      NAME/AGE/GENDER: Susu Hollins is a 56 y.o. female   PRIMARY DIAGNOSIS:  Primary osteoarthritis of left knee [M17.12]   Procedure(s) and Anesthesia Type:     * LEFT KNEE ARTHROPLASTY TOTAL ROBOTIC ASSISTED DAYA/JUNIOR - Spinal (Left)  ICD-10: Treatment Diagnosis:    · Pain in Left Knee (M25.562)  · Stiffness of Left Knee, Not elsewhere classified (S73.158)      ASSESSMENT:     Ms. Davey Robertson is s/p left TKA and presents with decreased weight bearing on left LE and decreased independence with functional mobility and activities of daily living as compared to baseline level of function and safety. Patient would benefit from skilled Occupational Therapy to maximize independence and safety with self-care task and functional mobility. Pt would also benefit from education on adaptive equipment and safety precautions in preparation for going home. Pt up in room and to bathroom, toileted and donned clothes.  Pt moves very well and should progress well tomorrow. This section established at most recent assessment   PROBLEM LIST (Impairments causing functional limitations):  1. Decreased Strength  2. Decreased ADL/Functional Activities  3. Decreased Transfer Abilities  4. Increased Pain  5. Increased Fatigue  6. Decreased Flexibility/Joint Mobility  7. Decreased Knowledge of Precautions   INTERVENTIONS PLANNED: (Benefits and precautions of occupational therapy have been discussed with the patient.)  1. Activities of daily living training  2. Adaptive equipment training  3. Balance training  4. Clothing management  5. Donning&doffing training  6. Theraputic activity     TREATMENT PLAN: Frequency/Duration: Follow patient 1-2 times to address above goals. Rehabilitation Potential For Stated Goals: Good     RECOMMENDED REHABILITATION/EQUIPMENT: (at time of discharge pending progress): Continue Skilled Therapy. OCCUPATIONAL PROFILE AND HISTORY:   History of Present Injury/Illness (Reason for Referral): Pt presents this date s/p (left) TKA. Past Medical History/Comorbidities:   Ms. Jose Mahan  has a past medical history of Arthritis, Constipation, High cholesterol, Postmenopausal, and Varicose vein of leg. Ms. Jose Mahan  has a past surgical history that includes hx hysterectomy (2002); hx breast biopsy (Left, 03/18/2013); hx cholecystectomy (12/19/2011); hx orthopaedic (Left, 03/20/2014); and hx knee arthroscopy (Left, 2005).   Social History/Living Environment:   Home Environment: Private residence  # Steps to Enter: 3  Rails to Enter: Yes  Hand Rails : Bilateral  One/Two Story Residence: Two story, live on 1st floor  # of Interior Steps: 12  Interior Rails: Left  Living Alone: No  Support Systems: Spouse/Significant Other/Partner  Patient Expects to be Discharged to[de-identified] Private residence  Current DME Used/Available at Home: Linzie Lipoma, straight, Shower chair, Walker, rolling  Tub or Shower Type: Tub/Shower combination  Prior Level of Function/Work/Activity:  Independent      Number of Personal Factors/Comorbidities that affect the Plan of Care: Brief history (0):  LOW COMPLEXITY   ASSESSMENT OF OCCUPATIONAL PERFORMANCE[de-identified]   Most Recent Physical Functioning:   Balance  Sitting: Intact  Standing: Intact; With support       Gross Assessment  AROM: Within functional limits(except L knee; S/P L TKA)  Strength: Generally decreased, functional          LLE AROM  L Knee Flexion: 60(~ post op)  L Knee Extension: -15(~ post op) Coordination  Fine Motor Skills-Upper: Left Intact; Right Intact  Gross Motor Skills-Upper: Left Intact; Right Intact         Mental Status  Neurologic State: Alert  Orientation Level: Oriented X4  Cognition: Appropriate decision making  Perception: Appears intact  Perseveration: No perseveration noted  Safety/Judgement: Awareness of environment                Basic ADLs (From Assessment) Complex ADLs (From Assessment)   Basic ADL  Feeding: Independent  Oral Facial Hygiene/Grooming: Supervision  Bathing: Moderate assistance  Upper Body Dressing: Supervision  Lower Body Dressing: Moderate assistance  Toileting: Minimum assistance     Grooming/Bathing/Dressing Activities of Daily Living     Cognitive Retraining  Safety/Judgement: Awareness of environment                 Functional Transfers  Bathroom Mobility: Contact guard assistance  Toilet Transfer : Minimum assistance  Shower Transfer: Minimum assistance     Bed/Mat Mobility  Supine to Sit: Stand-by assistance;Contact guard assistance  Sit to Stand: Contact guard assistance  Stand to Sit: Contact guard assistance  Bed to Chair: Contact guard assistance  Scooting: Stand-by assistance;Contact guard assistance         Physical Skills Involved:  1. Range of Motion  2. Balance  3. Strength Cognitive Skills Affected (resulting in the inability to perform in a timely and safe manner): 1. none Psychosocial Skills Affected:  1.  Environmental Adaptation   Number of elements that affect the Plan of Care: 3-5:  MODERATE COMPLEXITY   CLINICAL DECISION MAKING:   [facility] AM-PAC 6 Clicks   Daily Activity Inpatient Short Form  How much help from another person does the patient currently need. .. Total A Lot A Little None   1. Putting on and taking off regular lower body clothing? [] 1   [x] 2   [] 3   [] 4   2. Bathing (including washing, rinsing, drying)? [] 1   [x] 2   [] 3   [] 4   3. Toileting, which includes using toilet, bedpan or urinal?   [] 1   [] 2   [x] 3   [] 4   4. Putting on and taking off regular upper body clothing? [] 1   [] 2   [] 3   [x] 4   5. Taking care of personal grooming such as brushing teeth? [] 1   [] 2   [] 3   [x] 4   6. Eating meals? [] 1   [] 2   [] 3   [x] 4   © 2007, Trustees of [facility], under license to [employer]. All rights reserved     Score:  Initial: 19 Most Recent: X (Date: -- )    Interpretation of Tool:  Represents activities that are increasingly more difficult (i.e. Bed mobility, Transfers, Gait). Use of outcome tool(s) and clinical judgement create a POC that gives a: LOW COMPLEXITY            TREATMENT:   (In addition to Assessment/Re-Assessment sessions the following treatments were rendered)     Pre-treatment Symptoms/Complaints:    Pain: Initial:     0 Post Session:  0     Self Care: (10 min): Procedure(s) (per grid) utilized to improve and/or restore self-care/home management as related to bathing, toileting, and grooming. Required minimal verbal, manual, and   cueing to facilitate activities of daily living skills and compensatory activities. OT evaluation completed   Treatment/Session Assessment:     Response to Treatment:  pt tolerated well.     Education:  [] Home Exercises  [x] Fall Precautions  [] Hip Precautions [] Going Home Video  [x] Knee/Hip Prosthesis Review  [x] Walker Management/Safety [x] Adaptive Equipment as Needed       Interdisciplinary Collaboration:   o Physical Therapist  o Occupational Therapist  o Registered Nurse    After treatment position/precautions:   o Up in chair  o Bed/Chair-wheels locked  o Call light within reach  o RN notified  o Family at bedside     Compliance with Program/Exercises: Compliant all of the time, Will assess as treatment progresses. Recommendations/Intent for next treatment session:  Treatment next visit will focus on increasing Ms. Bhakta's independence with bed mobility, transfers, self care, functional mobility, modalities for pain, and patient education.       Total Treatment Duration:  OT Patient Time In/Time Out  Time In: [phone]  Time Out: [address] [facility], OT

## 2021-01-08 VITALS
SYSTOLIC BLOOD PRESSURE: 121 MMHG | TEMPERATURE: 97.7 F | HEIGHT: 62 IN | WEIGHT: 180 LBS | HEART RATE: 59 BPM | BODY MASS INDEX: 33.13 KG/M2 | DIASTOLIC BLOOD PRESSURE: 69 MMHG | RESPIRATION RATE: 16 BRPM | OXYGEN SATURATION: 95 %

## 2021-01-08 PROBLEM — M17.12 ARTHRITIS OF LEFT KNEE: Status: ACTIVE | Noted: 2021-01-08

## 2021-01-08 PROBLEM — Z96.652 TOTAL KNEE REPLACEMENT STATUS, LEFT: Status: ACTIVE | Noted: 2021-01-08

## 2021-01-08 PROCEDURE — 97116 GAIT TRAINING THERAPY: CPT

## 2021-01-08 PROCEDURE — 74011250637 HC RX REV CODE- 250/637: Performed by: ORTHOPAEDIC SURGERY

## 2021-01-08 PROCEDURE — 97535 SELF CARE MNGMENT TRAINING: CPT

## 2021-01-08 PROCEDURE — 97110 THERAPEUTIC EXERCISES: CPT

## 2021-01-08 PROCEDURE — 74011250636 HC RX REV CODE- 250/636: Performed by: ORTHOPAEDIC SURGERY

## 2021-01-08 RX ORDER — ASPIRIN 81 MG/1
81 TABLET ORAL EVERY 12 HOURS
Qty: 60 TAB | Refills: 0 | Status: SHIPPED | OUTPATIENT
Start: 2021-01-08 | End: 2021-02-07

## 2021-01-08 RX ORDER — OXYCODONE HYDROCHLORIDE 5 MG/1
5-10 TABLET ORAL
Qty: 60 TAB | Refills: 0 | Status: SHIPPED | OUTPATIENT
Start: 2021-01-08 | End: 2021-01-08

## 2021-01-08 RX ORDER — OXYCODONE HYDROCHLORIDE 5 MG/1
5-10 TABLET ORAL
Qty: 60 TAB | Refills: 0 | Status: SHIPPED | OUTPATIENT
Start: 2021-01-08 | End: 2021-01-15

## 2021-01-08 RX ADMIN — OXYCODONE 10 MG: 5 TABLET ORAL at 02:42

## 2021-01-08 RX ADMIN — Medication 2 G: at 03:00

## 2021-01-08 RX ADMIN — OXYCODONE 10 MG: 5 TABLET ORAL at 06:25

## 2021-01-08 RX ADMIN — Medication 81 MG: at 09:07

## 2021-01-08 RX ADMIN — CELECOXIB 200 MG: 200 CAPSULE ORAL at 09:07

## 2021-01-08 RX ADMIN — Medication 1 AMPULE: at 09:06

## 2021-01-08 RX ADMIN — DOCUSATE SODIUM 50MG AND SENNOSIDES 8.6MG 2 TABLET: 8.6; 5 TABLET, FILM COATED ORAL at 09:06

## 2021-01-08 RX ADMIN — OXYCODONE 10 MG: 5 TABLET ORAL at 11:03

## 2021-01-08 RX ADMIN — ACETAMINOPHEN 1000 MG: 500 TABLET ORAL at 06:25

## 2021-01-08 NOTE — PROGRESS NOTES
Orthopedic Joint Progress Note    2021  Admit Date: 2021  Admit Diagnosis: Primary osteoarthritis of left knee [M17.12]  Arthritis of knee, left [M17.12]    1 Day Post-Op    Subjective:     Tenzin Coe awake and alert    Review of Systems: Pertinent items are noted in HPI. Objective:     PT/OT:     PATIENT MOBILITY    Bed Mobility  Supine to Sit: Stand-by assistance, Contact guard assistance  Scooting: Stand-by assistance, Contact guard assistance  Transfers  Sit to Stand: Contact guard assistance  Stand to Sit: Contact guard assistance  Bed to Chair: Contact guard assistance  Toilet Transfers : Contact guard assistance      Gait  Speed/Opal: Slow  Stance: Left decreased  Gait Abnormalities: Antalgic, Decreased step clearance  Ambulation - Level of Assistance: Contact guard assistance  Distance (ft): 15 Feet (ft)  Assistive Device: Walker, rolling  Interventions: Manual cues, Safety awareness training, Verbal cues   Weight Bearing Status  Left Side Weight Bearing: As tolerated        Vital Signs:    Blood pressure 121/69, pulse (!) 59, temperature 97.7 °F (36.5 °C), resp. rate 16, height 5' 2\" (1.575 m), weight 81.6 kg (180 lb), SpO2 95 %.   Temp (24hrs), Av.7 °F (36.5 °C), Min:97 °F (36.1 °C), Max:98.4 °F (36.9 °C)      Pain Control:   Pain Assessment  Pain Scale 1: Numeric (0 - 10)  Pain Intensity 1: 7  Pain Location 1: Knee  Pain Orientation 1: Left  Pain Description 1: Burning, Aching  Pain Intervention(s) 1: Cold pack, Repositioned    Meds:  Current Facility-Administered Medications   Medication Dose Route Frequency    alcohol 62% (NOZIN) nasal  1 Ampule  1 Ampule Topical Q12H    0.9% sodium chloride infusion  100 mL/hr IntraVENous CONTINUOUS    sodium chloride (NS) flush 5-40 mL  5-40 mL IntraVENous Q8H    sodium chloride (NS) flush 5-40 mL  5-40 mL IntraVENous PRN    acetaminophen (TYLENOL) tablet 1,000 mg  1,000 mg Oral Q6H    celecoxib (CELEBREX) capsule 200 mg  200 mg Oral Q12H    oxyCODONE IR (ROXICODONE) tablet 5-10 mg  5-10 mg Oral Q4H PRN    HYDROmorphone (PF) (DILAUDID) injection 1 mg  1 mg IntraVENous Q3H PRN    naloxone (NARCAN) injection 0.2-0.4 mg  0.2-0.4 mg IntraVENous Q10MIN PRN    dexamethasone (DECADRON) 10 mg/mL injection 10 mg  10 mg IntraVENous ONCE    promethazine (PHENERGAN) tablet 25 mg  25 mg Oral Q6H PRN    diphenhydrAMINE (BENADRYL) capsule 25 mg  25 mg Oral Q4H PRN    senna-docusate (PERICOLACE) 8.6-50 mg per tablet 2 Tab  2 Tab Oral DAILY    aspirin delayed-release tablet 81 mg  81 mg Oral Q12H    ondansetron (ZOFRAN ODT) tablet 8 mg  8 mg Oral Q8H PRN        LAB:    Lab Results   Component Value Date/Time    INR 1.0 12/15/2020 09:38 AM     Lab Results   Component Value Date/Time    HGB 12.9 01/07/2021 06:51 PM    HGB 13.8 12/15/2020 09:38 AM    HGB 14.1 09/02/2020 01:25 PM       Incision 01/07/21 Knee Left (Active)   Dressing Status Clean;Dry; Intact 01/08/21 0242   Dressing/Treatment Other (Comment) 01/07/21 2010   Number of days: 1       Incision 01/07/21 Knee Left (Active)   Number of days: 1         Physical Exam:  Calves soft/ neuro intact      Assessment:      Principal Problem:     Total knee replacement status, left (1/8/2021)    Active Problems:    Arthritis of knee, left (1/7/2021)         Plan:     Continue PT/OT/Rehab  Consult: Rehab team including PT, OT, recreational therapy, and    Home soon    Patient Expects to be Discharged to[de-identified] Private residence

## 2021-01-08 NOTE — PROGRESS NOTES
Problem: Self Care Deficits Care Plan (Adult)  Goal: *Acute Goals and Plan of Care (Insert Text)  Description: GOALS:   DISCHARGE GOALS (in preparation for going home/rehab):  3 days  1. Ms. Nancy Guzmán will perform one lower body dressing activity with minimal assistance required to demonstrate improved functional mobility and safety. 2.  Ms. Nancy Guzmán will perform one lower body bathing activity with minimal assistance required to demonstrate improved functional mobility and safety. 3.  Ms. Nancy Guzmán will perform toileting/toilet transfer with contact guard assistance to demonstrate improved functional mobility and safety. 4.  Ms. Nancy Guzmán will perform shower transfer with contact guard assistance to demonstrate improved functional mobility and safety. Outcome: MET    JOINT CAMP OCCUPATIONAL THERAPY TKA: Daily Note, Treatment Day: 1st and AM 1/8/2021  OUTPATIENT: Hospital Day: 2  Payor: Tigre Freire / Plan: [facility] / Product Type: PPO /      NAME/AGE/GENDER: Janusz Sy is a 56 y.o. female   PRIMARY DIAGNOSIS:  Primary osteoarthritis of left knee [M17.12]   Procedure(s) and Anesthesia Type:     * LEFT KNEE ARTHROPLASTY TOTAL ROBOTIC ASSISTED DAYA/JUNIOR - Spinal (Left)  ICD-10: Treatment Diagnosis:    · Pain in Left Knee (M25.562)  · Stiffness of Left Knee, Not elsewhere classified (S34.259)      ASSESSMENT:     Ms. Nancy Guzmán is s/p left TKA and presents with decreased weight bearing on left LE and decreased independence with functional mobility and activities of daily living as compared to baseline level of function and safety. Patient would benefit from skilled Occupational Therapy to maximize independence and safety with self-care task and functional mobility. Pt would also benefit from education on adaptive equipment and safety precautions in preparation for going home. Pt up in room and to bathroom, toileted and donned clothes. Pt moves very well and should progress well tomorrow. 1/8/2021   Ms. Luis James is s/p left TKA and presents with decreased weight bearing on left LE and decreased independence with functional mobility and activities of daily living. Patient completed shower and dressing as charter below in ADL grid and is ambulating with rolling walker and supervision assist.  Patient has met 4/4 goals and plans to return home with good family support. Family able to provide patient with appropriate level of assistance at this time. OT reviewed safety precautions throughout session and therapy schedule for the remainder of today. Patient instructed to call for assistance when needing to get up from recliner and all needs in reach. Patient verbalized understanding of call light. This section established at most recent assessment   PROBLEM LIST (Impairments causing functional limitations):  1. Decreased Strength  2. Decreased ADL/Functional Activities  3. Decreased Transfer Abilities  4. Increased Pain  5. Increased Fatigue  6. Decreased Flexibility/Joint Mobility  7. Decreased Knowledge of Precautions   INTERVENTIONS PLANNED: (Benefits and precautions of occupational therapy have been discussed with the patient.)  1. Activities of daily living training  2. Adaptive equipment training  3. Balance training  4. Clothing management  5. Donning&doffing training  6. Theraputic activity     TREATMENT PLAN: Frequency/Duration: Follow patient 1-2 times to address above goals. Rehabilitation Potential For Stated Goals: Good     RECOMMENDED REHABILITATION/EQUIPMENT: (at time of discharge pending progress): Continue Skilled Therapy. OCCUPATIONAL PROFILE AND HISTORY:   History of Present Injury/Illness (Reason for Referral): Pt presents this date s/p (left) TKA. Past Medical History/Comorbidities:   Ms. Luis James  has a past medical history of Arthritis, Constipation, High cholesterol, Postmenopausal, and Varicose vein of leg.   Ms. Luis James  has a past surgical history that includes hx hysterectomy (2002); hx breast biopsy (Left, 03/18/2013); hx cholecystectomy (12/19/2011); hx orthopaedic (Left, 03/20/2014); and hx knee arthroscopy (Left, 2005). Social History/Living Environment:   Home Environment: Private residence  # Steps to Enter: 3  Rails to Enter: Yes  Hand Rails : Bilateral  One/Two Story Residence: Two story, live on 1st floor  # of Interior Steps: 12  Interior Rails: Left  Living Alone: No  Support Systems: Spouse/Significant Other/Partner  Patient Expects to be Discharged to[de-identified] Private residence  Current DME Used/Available at Home: Adin Oliver, straight, Shower chair, Walker, rolling  Tub or Shower Type: Tub/Shower combination  Prior Level of Function/Work/Activity:  Independent      Number of Personal Factors/Comorbidities that affect the Plan of Care: Brief history (0):  LOW COMPLEXITY   ASSESSMENT OF OCCUPATIONAL PERFORMANCE[de-identified]   Most Recent Physical Functioning:   Balance  Sitting: Intact  Standing: Intact                  LLE AROM  L Knee Flexion: 90  L Knee Extension: 6                            Basic ADLs (From Assessment) Complex ADLs (From Assessment)   Basic ADL  Feeding: Independent  Oral Facial Hygiene/Grooming: Supervision  Bathing: Moderate assistance  Type of Bath: Chlorhexidine (CHG), Full, Shower  Upper Body Dressing: Supervision  Lower Body Dressing: Moderate assistance  Toileting: Minimum assistance     Grooming/Bathing/Dressing Activities of Daily Living   Grooming  Grooming Assistance: Supervision     Upper Body Bathing  Bathing Assistance: Supervision     Lower Body Bathing  Bathing Assistance: Supervision Toileting  Toileting Assistance: Supervision  Cues: Verbal cues provided  Adaptive Equipment: Grab bars; Walker   Upper Body Dressing Assistance  Dressing Assistance: Supervision Functional Transfers  Bathroom Mobility: Supervision/set up  Toilet Transfer : Supervision  Shower Transfer: Supervision  Cues: Verbal cues provided  Adaptive Equipment: Grab bars;Walker (comment)   Lower Body Dressing Assistance  Dressing Assistance: Supervision  Cues: Verbal cues provided  Adaptive Equipment Used: Grab bar;Walker Bed/Mat Mobility  Sit to Stand: Stand-by assistance  Stand to Sit: Stand-by assistance  Bed to Chair: Stand-by assistance         Physical Skills Involved:  1. Range of Motion  2. Balance  3. Strength Cognitive Skills Affected (resulting in the inability to perform in a timely and safe manner): 1. none Psychosocial Skills Affected:  1. Environmental Adaptation   Number of elements that affect the Plan of Care: 3-5:  MODERATE COMPLEXITY   CLINICAL DECISION MAKING:   [facility] AM-PAC 6 Clicks   Daily Activity Inpatient Short Form  How much help from another person does the patient currently need. .. Total A Lot A Little None   1. Putting on and taking off regular lower body clothing? [] 1   [x] 2   [] 3   [] 4   2. Bathing (including washing, rinsing, drying)? [] 1   [x] 2   [] 3   [] 4   3. Toileting, which includes using toilet, bedpan or urinal?   [] 1   [] 2   [x] 3   [] 4   4. Putting on and taking off regular upper body clothing? [] 1   [] 2   [] 3   [x] 4   5. Taking care of personal grooming such as brushing teeth? [] 1   [] 2   [] 3   [x] 4   6. Eating meals? [] 1   [] 2   [] 3   [x] 4   © 2007, Trustees of [facility], under license to [employer]. All rights reserved     Score:  Initial: 19 Most Recent: X (Date: -- )    Interpretation of Tool:  Represents activities that are increasingly more difficult (i.e. Bed mobility, Transfers, Gait).      Use of outcome tool(s) and clinical judgement create a POC that gives a: LOW COMPLEXITY            TREATMENT:   (In addition to Assessment/Re-Assessment sessions the following treatments were rendered)     Pre-treatment Symptoms/Complaints:    Pain: Initial:     0 Post Session:  0     Self Care: (45 min): Procedure(s) (per grid) utilized to improve and/or restore self-care/home management as related to dressing, bathing, toileting and grooming. Required minimal verbal and   cueing to facilitate activities of daily living skills and compensatory activities. Treatment/Session Assessment:     Response to Treatment:  pt tolerated well up to shower     Education:  [] Home Exercises  [x] Fall Precautions  [] Hip Precautions [] Going Home Video  [x] Knee/Hip Prosthesis Review  [x] Walker Management/Safety [x] Adaptive Equipment as Needed       Interdisciplinary Collaboration:   o Physical Therapist  o Occupational Therapist  o Registered Nurse    After treatment position/precautions:   o Up in chair  o Bed/Chair-wheels locked  o Call light within reach  o RN notified     Compliance with Program/Exercises: Compliant all of the time. Recommendations/Intent for next treatment session: Pt doing well all goals met and will do well at home with support from . Patient will be discharged home with home health PT. No further Occupational Therapy warranted, will discharge Occupational Therapy services.         Total Treatment Duration:  OT Patient Time In/Time Out  Time In: 0900  Time Out: [place], OT

## 2021-01-08 NOTE — PROGRESS NOTES
2021         Post Op day: 1 Day Post-Op     Admit Date: 2021  Admit Diagnosis: Primary osteoarthritis of left knee [M17.12]  Arthritis of knee, left [M17.12]        Subjective: Doing well, No complaints, No SOB, No Chest Pain, No Nausea or Vomiting     Objective:   Vital Signs are Stable, No Acute Distress, Alert and Oriented, Dressing is Dry,  Neurovascular exam is normal.     Assessment / Plan :  Patient Active Problem List   Diagnosis Code    Family hx-breast malignancy Z80.3    Special screening for malignant neoplasms, colon Z12.11    Arthritis of knee, left M17.12    Total knee replacement status, left Z96.652      Patient Vitals for the past 8 hrs:   BP Temp Pulse Resp SpO2   21 0242 121/69 97.7 °F (36.5 °C) (!) 59 16 95 %   21 0030 103/63 98 °F (36.7 °C) 76 16 99 %    Temp (24hrs), Av.7 °F (36.5 °C), Min:97 °F (36.1 °C), Max:98.4 °F (36.9 °C)    Body mass index is 32.92 kg/m².     Lab Results   Component Value Date/Time    HGB 12.9 2021 06:51 PM      Pt seen by and discussed with SpeechTrans today       Signed By: HAYDEN Sorto

## 2021-01-08 NOTE — PROGRESS NOTES
01/07/21 2010   Oxygen Therapy   O2 Sat (%) 96 %   Pulse via Oximetry 72 beats per minute   O2 Device Room air   Incentive Spirometry Treatment   Actual Volume (ml) 1500 ml   Number of Attempts 2   Pt on continuous monitor for HS. Alarm limits set.  Pt working on IS

## 2021-01-08 NOTE — PROGRESS NOTES
Shift assessment complete. Pt a/ox4 and resting in bed. Dressing on left knee c/di with ice man in place. IV site c/d/i, patent and capped. Strong bilateral plantar/dorsiflexion with pedal pulses present and palpable +2. SCDs in place. Bed wheels locked, side rails x3, grippper socks on, and call light in reach. Encouraged to call for help if needed and pt verbalized understanding.

## 2021-01-08 NOTE — ADDENDUM NOTE
Addendum  created 01/08/21 0755 by Agustín Wallace CRNA    Flowsheet accepted, Intraprocedure Flowsheets edited

## 2021-01-08 NOTE — DISCHARGE INSTRUCTIONS
[facility]   Patient Discharge Instructions    Jessica Ordulce / [id number] : 1960    Admitted 2021 Discharged: 2021     IF YOU HAVE ANY PROBLEMS ONCE YOU ARE AT HOME CALL THE FOLLOWING NUMBERS:   Main office number: [phone]    Take Home Medications     · It is important that you take the medication exactly as they are prescribed. · Keep your medication in the bottles provided by the pharmacist and keep a list of the medication names, dosages, and times to be taken in your wallet. · Do not take other medications without consulting your doctor. What to do at [address] your prehospital diet. If you have excessive nausea or vomitting call your doctor's office     Home Physical Therapy is arranged. Use rolling walker when walking. Patients who have had a joint replacement should not drive until you are seen for your follow up appointment by Dr. Rebecca Lowe. When to Call    - Call if you have a temperature greater then 101  - Unable to keep food down  - Loose control of your bladder or bowel function  - Are unable to bear any weight   - Need a pain medication refill       DISCHARGE SUMMARY from Nurse    The following personal items collected during your admission are returned to you:   Dental Appliance: Dental Appliances: None  Vision: Visual Aid: None  Hearing Aid:    Jewelry: Jewelry: None  Clothing: Clothing: Pants, Shirt  Other Valuables: Other Valuables: Cell Phone  Valuables sent to safe:      PATIENT INSTRUCTIONS:    After general anesthesia or intravenous sedation, for 24 hours or while taking prescription Narcotics:  · Limit your activities  · Do not drive and operate hazardous machinery  · Do not make important personal or business decisions  · Do  not drink alcoholic beverages  · If you have not urinated within 8 hours after discharge, please contact your surgeon on call.     Report the following to your surgeon:  · Excessive pain, swelling, redness or odor of or around the surgical area  · Temperature over 101  · Nausea and vomiting lasting longer than 4 hours or if unable to take medications  · Any signs of decreased circulation or nerve impairment to extremity: change in color, persistent  numbness, tingling, coldness or increase pain  · Any questions, call office @ [phone]      Keep scheduled follow up appointment. If need to change, call office @ [phone]. *  Please give a list of your current medications to your Primary Care Provider. *  Please update this list whenever your medications are discontinued, doses are      changed, or new medications (including over-the-counter products) are added. *  Please carry medication information at all times in case of emergency situations. Patient Education        Total Knee Replacement: What to Expect at [address] [place] [address] Drive had a total knee replacement. The doctor replaced the worn ends of the bones that connect to your knee (thighbone and lower leg bone) with plastic and metal parts. When you leave the hospital, you should be able to move around with a walker or crutches. But you will need someone to help you at home for the next few weeks or until you have more energy and can move around better. If you need more extensive rehab, you may go to a specialized rehab center for more treatment. You will go home with a bandage and stitches, staples, tissue glue, or tape strips. Change the bandage as your doctor tells you to. If you have stitches or staples, your doctor will remove them 10 to 21 days after your surgery. Glue or tape strips will fall off on their own over time. You may still have some mild pain, and the area may be swollen for 3 to 6 months after surgery. Your knee will continue to improve for 6 to 12 months. You will probably use a walker for 1 to 3 weeks and then use crutches. When you are ready, you can use a cane. You will probably be able to walk on your own in 4 to 8 weeks.   You will need to do months of physical rehabilitation (rehab) after a knee replacement. Rehab will help you strengthen the muscles of the knee and help you regain movement. After you recover, your artificial knee will allow you to do normal daily activities with less pain or no pain at all. You may be able to hike, dance, ride a bike, and play golf. Talk to your doctor about whether you can do more strenuous activities. Always tell your caregivers that you have an artificial knee. How long it will take to walk on your own, return to normal activities, and go back to work depends on your health and how well your rehabilitation (rehab) program goes. The better you do with your rehab exercises, the quicker you will get your strength and movement back. This care sheet gives you a general idea about how long it will take for you to recover. But each person recovers at a different pace. Follow the steps below to get better as quickly as possible. How can you care for yourself at home? Activity    · Rest when you feel tired. You may take a nap, but don't stay in bed all day. When you sit, use a chair with arms. You can use the arms to help you stand up.     · Work with your physical therapist to find the best way to exercise. What you can do as your knee heals will depend on whether your new knee is cemented or uncemented. You may not be able to do certain things for a while if your new knee is uncemented.     · After your knee has healed enough, you can do more strenuous activities with caution. ? You can golf, but use a golf cart. And don't wear shoes with spikes. ? You can bike on a flat road or on a stationary bike. Avoid biking up hills. ? Your doctor may suggest that you stay away from activities that put stress on your knee. These include tennis, badminton, squash, racquetball, contact sports like football, jumping (such as in basketball), jogging, and running. ? Avoid activities where you might fall.  These include horseback riding, skiing, and mountain biking.     · Do not sit for more than 1 hour at a time. Get up and walk around for a while before you sit again. If you must sit for a long time, prop up your leg with a chair or footstool. This will help you avoid swelling.     · Ask your doctor when you can drive again. It may take up to 8 weeks after knee replacement surgery before it's safe for you to drive.     · When you get into a car, sit on the edge of the seat. Then pull in your legs, and turn to face the front.     · You should be able to do many everyday activities 3 to 6 weeks after your surgery. You will probably need to take 4 to 16 weeks off from work. When you can go back to work depends on the type of work you do and how you feel.     · Ask your doctor when it is okay for you to have sex.     · For 12 weeks, do not lift anything heavier than 10 pounds and do not lift weights. Diet    · By the time you leave the hospital, you should be eating your normal diet. If your stomach is upset, try bland, low-fat foods like plain rice, broiled chicken, toast, and yogurt. Your doctor may suggest that you take iron and vitamin supplements.     · Drink plenty of fluids (unless your doctor tells you not to).   · Eat healthy foods, and watch your portion sizes. Try to stay at your ideal weight. Too much weight puts more stress on your new knee.     · You may notice that your bowel movements are not regular right after your surgery. This is common. Try to avoid constipation and straining with bowel movements. You may want to take a fiber supplement every day. If you have not had a bowel movement after a couple of days, ask your doctor about taking a mild laxative. Medicines    · Your doctor will tell you if and when you can restart your medicines.  He or she will also give you instructions about taking any new medicines.     · If you take aspirin or some other blood thinner, ask your doctor if and when to start taking it again. Make sure that you understand exactly what your doctor wants you to do.     · Your doctor may give you a blood-thinning medicine to prevent blood clots. If you take a blood thinner, be sure you get instructions about how to take your medicine safely. Blood thinners can cause serious bleeding problems. This medicine could be in pill form or as a shot (injection). If a shot is needed, your doctor will tell you how to do this.     · Be safe with medicines. Take pain medicines exactly as directed. ? If the doctor gave you a prescription medicine for pain, take it as prescribed. ? If you are not taking a prescription pain medicine, ask your doctor if you can take an over-the-counter medicine. ? Plan to take your pain medicine 30 minutes before exercises. It is easier to prevent pain before it starts than to stop it after it has started.     · If you think your pain medicine is making you sick to your stomach:  ? Take your medicine after meals (unless your doctor has told you not to). ? Ask your doctor for a different pain medicine.     · If your doctor prescribed antibiotics, take them as directed. Do not stop taking them just because you feel better. You need to take the full course of antibiotics. Incision care    · If your doctor told you how to care for your cut (incision), follow your doctor's instructions. You will have a dressing over the cut. A dressing helps the incision heal and protects it. Your doctor will tell you how to take care of this.     · If you did not get instructions, follow this general advice:  ? If you have strips of tape on the cut the doctor made, leave the tape on for a week or until it falls off.  ? If you have stitches or staples, your doctor will tell you when to come back to have them removed. ? If you have skin adhesive on the cut, leave it on until it falls off. Skin adhesive is also called glue or liquid stitches. ? Change the bandage every day. ?  Wash the area daily with warm water, and pat it dry. Don't use hydrogen peroxide or alcohol. They can slow healing. ? You may cover the area with a gauze bandage if it oozes fluid or rubs against clothing. ? You may shower 24 to 48 hours after surgery. Pat the incision dry. Don't swim or take a bath for the first 2 weeks, or until your doctor tells you it is okay. Exercise    · Your rehab program will give you a number of exercises to do to help you get back your knee's range of motion and strength. Always do them as your therapist tells you. Ice    · For pain and swelling, put ice or a cold pack on the area for 10 to 20 minutes at a time. Put a thin cloth between the ice and your skin. Other instructions    · Keep wearing your support stockings as your doctor says. These help to prevent blood clots. How long you'll have to wear them depends on your activity level and the amount of swelling.     · Carry a medical alert card that says you have an artificial joint. You have metal pieces in your knee. These may set off some airport metal detectors. Follow-up care is a key part of your treatment and safety. Be sure to make and go to all appointments, and call your doctor if you are having problems. It's also a good idea to know your test results and keep a list of the medicines you take. When should you call for help? Call 911 anytime you think you may need emergency care. For example, call if:    · You passed out (lost consciousness).     · You have severe trouble breathing.     · You have sudden chest pain and shortness of breath, or you cough up blood. Call your doctor now or seek immediate medical care if:    · You have signs of infection, such as:  ? Increased pain, swelling, warmth, or redness. ? Red streaks leading from the incision. ? Pus draining from the incision. ? A fever.     · You have signs of a blood clot, such as:  ? Pain in your calf, back of the knee, thigh, or groin. ?  Redness and swelling in your leg or groin.     · Your incision comes open and begins to bleed, or the bleeding increases.     · You have pain that does not get better after you take pain medicine. Watch closely for changes in your health, and be sure to contact your doctor if:    · You do not have a bowel movement after taking a laxative. Where can you learn more? Go to http://www.gray.com/  Enter T054 in the search box to learn more about \"Total Knee Replacement: What to Expect at Home. \"  Current as of: March 2, 2020               Content Version: 12.6  © 4999-3162 [email or web address]. Care instructions adapted under license by [employer] (which disclaims liability or warranty for this information). If you have questions about a medical condition or this instruction, always ask your healthcare professional. [address] any warranty or liability for your use of this information. These are general instructions for a healthy lifestyle:    No smoking/ No tobacco products/ Avoid exposure to second hand smoke    Surgeon General's Warning:  Quitting smoking now greatly reduces serious risk to your health. Obesity, smoking, and sedentary lifestyle greatly increases your risk for illness    A healthy diet, regular physical exercise & weight monitoring are important for maintaining a healthy lifestyle    You may be retaining fluid if you have a history of heart failure or if you experience any of the following symptoms:  Weight gain of 3 pounds or more overnight or 5 pounds in a week, increased swelling in our hands or feet or shortness of breath while lying flat in bed. Please call your doctor as soon as you notice any of these symptoms; do not wait until your next office visit.     Recognize signs and symptoms of STROKE:    F-face looks uneven    A-arms unable to move or move even    S-speech slurred or non-existent    T-time-call 911 as soon as signs and symptoms begin-DO NOT go       Back to bed or wait to see if you get better-TIME IS BRAIN. The discharge information has been reviewed with the patient. The patient verbalized understanding. Information obtained by :  I understand that if any problems occur once I am at home I am to contact my physician. I understand and acknowledge receipt of the instructions indicated above.                                                                                                                                            Physician's or R.N.'s Signature                                                                  Date/Time                                                                                                                                              Patient or Representative Signature                                                          Date/Time

## 2021-01-08 NOTE — PROGRESS NOTES
Problem: Mobility Impaired (Adult and Pediatric)  Goal: *Acute Goals and Plan of Care (Insert Text)  Outcome: Progressing Towards Goal  Note: GOALS (1-4 days):  (1.)Ms. Sylvania Cheadle will move from supine to sit and sit to supine  in bed with STAND BY ASSIST. 1/8  (2.)Ms. Sylvania Cheadle will transfer from bed to chair and chair to bed with STAND BY ASSIST using the least restrictive device. 1/8  (3.)Ms. Sylvania Cheadle will ambulate with STAND BY ASSIST for 200 feet with the least restrictive device. 1/8  (4.)Ms. Sylvania Cheadle will ambulate up/down 3 steps with bilateral  railing with CONTACT GUARD ASSIST with no device. Review per pt  (5.)Ms. Sylvania Cheadle will increase left knee ROM to 5°-80°. 1/8  ________________________________________________________________________________________________      PHYSICAL THERAPY JOINT CAMP TKA: Daily Note and AM 1/8/2021  INPATIENT: Hospital Day: 2  Payor: Jamal Lugo / Plan: [facility] / Product Type: PPO /      NAME/AGE/GENDER: Rafita Nolen is a 56 y.o. female   PRIMARY DIAGNOSIS:  Primary osteoarthritis of left knee [M17.12]   Procedure(s) and Anesthesia Type:     * LEFT KNEE ARTHROPLASTY TOTAL ROBOTIC ASSISTED DAYA/JUNIOR - Spinal (Left)  ICD-10: Treatment Diagnosis:    · Pain in Left Knee (M25.562)  · Stiffness of Left Knee, Not elsewhere classified (M25.662)  · Difficulty in walking, Not elsewhere classified (R26.2)  · Other abnormalities of gait and mobility (R26.89)      ASSESSMENT:     Ms. Sylvania Cheadle presents with decreased strength and ROM L LE and limited functional mobility S/P L TKA. Plans to discharge home with support of spouse. She will benefit from skilled therapy services to address the below problem list.   1/8 participated well. Performs TK exercises in the bed with good technique and verbal cues. SBA with all bed mobility. Ambulated 300 ft using RW with SBA while working in normal gait pattern.   Return to the recliner with needs in reach and eating breakfast.  Therapist review instruction sheet for the cool jet/PT DC sheet all question answer and ready for D/C. This section established at most recent assessment   PROBLEM LIST (Impairments causing functional limitations):  1. Decreased Strength  2. Decreased ADL/Functional Activities  3. Decreased Transfer Abilities  4. Decreased Ambulation Ability/Technique  5. Decreased Flexibility/Joint Mobility  6. Decreased Shannon with Home Exercise Program   INTERVENTIONS PLANNED: (Benefits and precautions of physical therapy have been discussed with the patient.)  1. Bed Mobility  2. Cold  3. Gait Training  4. Home Exercise Program (HEP)  5. Range of Motion (ROM)  6. Therapeutic Activites  7. Therapeutic Exercise/Strengthening  8. Transfer Training     TREATMENT PLAN: Frequency/Duration: Follow patient BID for duration of hospital stay to address above goals. Rehabilitation Potential For Stated Goals: Good     RECOMMENDED REHABILITATION/EQUIPMENT: (at time of discharge pending progress): Continue Skilled Therapy and Home Health: Physical Therapy. HISTORY:   History of Present Injury/Illness (Reason for Referral):  S/P L TKA  Past Medical History/Comorbidities:   Ms. Yris Bertrand  has a past medical history of Arthritis, Constipation, High cholesterol, Postmenopausal, and Varicose vein of leg. Ms. Yris Bertrand  has a past surgical history that includes hx hysterectomy (2002); hx breast biopsy (Left, 03/18/2013); hx cholecystectomy (12/19/2011); hx orthopaedic (Left, 03/20/2014); and hx knee arthroscopy (Left, 2005).   Social History/Living Environment:   Home Environment: Private residence  # Steps to Enter: 3  Rails to Enter: Yes  Hand Rails : Bilateral  One/Two Story Residence: Two story, live on 1st floor  # of Interior Steps: 12  Interior Rails: Left  Living Alone: No  Support Systems: Spouse/Significant Other/Partner  Patient Expects to be Discharged to[de-identified] Private residence  Current DME Used/Available at Home: Redgie Blocker, straight, Shower chair, Walker, rolling  Tub or Shower Type: Tub/Shower combination  Prior Level of Function/Work/Activity:  Independent   Number of Personal Factors/Comorbidities that affect the Plan of Care: 0: LOW COMPLEXITY   EXAMINATION:   Most Recent Physical Functioning:                 LLE AROM  L Knee Flexion: 90  L Knee Extension: 6               Transfers  Sit to Stand: Stand-by assistance  Stand to Sit: Stand-by assistance  Bed to Chair: Stand-by assistance    Balance  Sitting: Intact  Standing: Intact; With support              Weight Bearing Status  Left Side Weight Bearing: As tolerated  Distance (ft): 300 Feet (ft)  Ambulation - Level of Assistance: Stand-by assistance  Assistive Device: Walker, rolling  Speed/Opal: Pace decreased (<100 feet/min)  Stance: Left decreased;Right decreased  Gait Abnormalities: Decreased step clearance  Interventions: Safety awareness training     Braces/Orthotics: none    Left Knee Cold  Type: Cryocuff      Body Structures Involved:  1. Bones  2. Joints  3. Muscles Body Functions Affected:  1. Neuromusculoskeletal  2. Movement Related Activities and Participation Affected:  1. General Tasks and Demands  2. Mobility   Number of elements that affect the Plan of Care: 3: MODERATE COMPLEXITY   CLINICAL PRESENTATION:   Presentation: Stable and uncomplicated: LOW COMPLEXITY   CLINICAL DECISION MAKIN [place] [id number] AM-PAC 6 Clicks   Basic Mobility Inpatient Short Form  How much difficulty does the patient currently have. .. Unable A Lot A Little None   1. Turning over in bed (including adjusting bedclothes, sheets and blankets)? [] 1   [] 2   [] 3   [x] 4   2. Sitting down on and standing up from a chair with arms ( e.g., wheelchair, bedside commode, etc.)   [] 1   [] 2   [x] 3   [] 4   3. Moving from lying on back to sitting on the side of the bed? [] 1   [] 2   [x] 3   [] 4   How much help from another person does the patient currently need. .. Total A Lot A Little None   4. Moving to and from a bed to a chair (including a wheelchair)? [] 1   [] 2   [x] 3   [] 4   5. Need to walk in hospital room? [] 1   [] 2   [x] 3   [] 4   6. Climbing 3-5 steps with a railing? [] 1   [] 2   [x] 3   [] 4   © 2007, Trustees of [address] [place] [address], under license to [employer]. All rights reserved     Score:  Initial: 19 Most Recent: X (Date: -- )    Interpretation of Tool:  Represents activities that are increasingly more difficult (i.e. Bed mobility, Transfers, Gait). Medical Necessity:     · Patient demonstrates   · good  ·  rehab potential due to higher previous functional level. Reason for Services/Other Comments:  · Patient   · continues to require present interventions due to patient's inability to perform exercises and functional mobility independently  · . Use of outcome tool(s) and clinical judgement create a POC that gives a: Clear prediction of patient's progress: LOW COMPLEXITY            TREATMENT:   (In addition to Assessment/Re-Assessment sessions the following treatments were rendered)     Pre-treatment Symptoms/Complaints: less pain today. Pain Initial:   Pain Intensity 1: 0(same after therapy)  Post Session:     Therapeutic Exercise: (15 Minutes):  Exercises per grid below to improve mobility and strength. Required minimal verbal cues to  perform exercises correctly . Gait Training (23 Minutes):  Gait training to improve and/or restore physical functioning as related to mobility. Ambulated 300 Feet (ft) with Stand-by assistance using a Walker, rolling and minimal Safety awareness training related to their knee position and motion to promote proper body alignment.      Date:  1/7/21 Date:  1/8   Date:     ACTIVITY/EXERCISE AM PM AM PM AM PM   GROUP THERAPY  []  []  []  []  []  []   Ankle Pumps  10 15      Quad Sets  10 15      Gluteal Sets  10 15      Hip ABd/ADduction  10 15      Straight Leg Raises   15      Knee Slides  10 AA 15      Short Arc Quads  10 AA 15 Long Arc Quads         Chair Slides   15               B = bilateral; AA = active assistive; A = active; P = passive      Treatment/Session Assessment:     Response to Treatment:  tolerated therapy well. Ready for D/C    Education:  [x] Home Exercises  [x] Fall Precautions  [x] Use of Cold Therapy Unit [] D/C Instruction Review  [] Knee Prosthesis Review  [x] Walker Management/Safety [] Adaptive Equipment as Needed       Interdisciplinary Collaboration:   o Physical Therapy Assistant  o Registered Nurse    After treatment position/precautions:   o Up in chair  o Bed/Chair-wheels locked  o Call light within reach  o RN notified    Compliance with Program/Exercises: Compliant most of the time, Will assess as treatment progresses. Recommendations/Intent for next treatment session:  Treatment next visit will focus on increasing Ms. Bhakta's independence with bed mobility, transfers, gait training, strength/ROM exercises, modalities for pain, and patient education.       Total Treatment Duration:  PT Patient Time In/Time Out  Time In: 1364  Time Out: 361 [facility], [facility] 20-May-2020 15:45

## 2021-01-09 ENCOUNTER — HOME CARE VISIT (OUTPATIENT)
Dept: SCHEDULING | Facility: HOME HEALTH | Age: 61
End: 2021-01-09
Payer: COMMERCIAL

## 2021-01-09 VITALS
RESPIRATION RATE: 16 BRPM | TEMPERATURE: 98 F | OXYGEN SATURATION: 97 % | DIASTOLIC BLOOD PRESSURE: 85 MMHG | SYSTOLIC BLOOD PRESSURE: 125 MMHG | HEART RATE: 72 BPM

## 2021-01-09 PROCEDURE — G0151 HHCP-SERV OF PT,EA 15 MIN: HCPCS

## 2021-01-09 PROCEDURE — 400013 HH SOC

## 2021-01-11 ENCOUNTER — HOME CARE VISIT (OUTPATIENT)
Dept: SCHEDULING | Facility: HOME HEALTH | Age: 61
End: 2021-01-11
Payer: COMMERCIAL

## 2021-01-11 PROCEDURE — G0157 HHC PT ASSISTANT EA 15: HCPCS

## 2021-01-12 VITALS
TEMPERATURE: 97.1 F | HEART RATE: 72 BPM | OXYGEN SATURATION: 98 % | RESPIRATION RATE: 18 BRPM | SYSTOLIC BLOOD PRESSURE: 130 MMHG | DIASTOLIC BLOOD PRESSURE: 84 MMHG

## 2021-01-13 ENCOUNTER — HOME CARE VISIT (OUTPATIENT)
Dept: SCHEDULING | Facility: HOME HEALTH | Age: 61
End: 2021-01-13
Payer: COMMERCIAL

## 2021-01-13 VITALS
RESPIRATION RATE: 17 BRPM | HEART RATE: 76 BPM | TEMPERATURE: 97.1 F | DIASTOLIC BLOOD PRESSURE: 82 MMHG | SYSTOLIC BLOOD PRESSURE: 130 MMHG

## 2021-01-13 PROCEDURE — G0157 HHC PT ASSISTANT EA 15: HCPCS

## 2021-01-15 ENCOUNTER — HOME CARE VISIT (OUTPATIENT)
Dept: SCHEDULING | Facility: HOME HEALTH | Age: 61
End: 2021-01-15
Payer: COMMERCIAL

## 2021-01-15 PROCEDURE — G0157 HHC PT ASSISTANT EA 15: HCPCS

## 2021-01-16 VITALS
SYSTOLIC BLOOD PRESSURE: 130 MMHG | DIASTOLIC BLOOD PRESSURE: 80 MMHG | HEART RATE: 74 BPM | TEMPERATURE: 97.2 F | RESPIRATION RATE: 17 BRPM

## 2021-01-18 ENCOUNTER — HOME CARE VISIT (OUTPATIENT)
Dept: SCHEDULING | Facility: HOME HEALTH | Age: 61
End: 2021-01-18
Payer: COMMERCIAL

## 2021-01-18 PROCEDURE — G0157 HHC PT ASSISTANT EA 15: HCPCS

## 2021-01-19 VITALS
SYSTOLIC BLOOD PRESSURE: 118 MMHG | DIASTOLIC BLOOD PRESSURE: 60 MMHG | RESPIRATION RATE: 16 BRPM | HEART RATE: 76 BPM | TEMPERATURE: 97.3 F

## 2021-01-19 PROCEDURE — A4649 SURGICAL SUPPLIES: HCPCS

## 2021-01-21 ENCOUNTER — HOME CARE VISIT (OUTPATIENT)
Dept: SCHEDULING | Facility: HOME HEALTH | Age: 61
End: 2021-01-21
Payer: COMMERCIAL

## 2021-01-21 PROCEDURE — G0157 HHC PT ASSISTANT EA 15: HCPCS

## 2021-01-22 VITALS
HEART RATE: 76 BPM | DIASTOLIC BLOOD PRESSURE: 78 MMHG | RESPIRATION RATE: 16 BRPM | TEMPERATURE: 97.1 F | SYSTOLIC BLOOD PRESSURE: 124 MMHG

## 2021-01-26 ENCOUNTER — HOME CARE VISIT (OUTPATIENT)
Dept: SCHEDULING | Facility: HOME HEALTH | Age: 61
End: 2021-01-26
Payer: COMMERCIAL

## 2021-01-26 VITALS
HEART RATE: 72 BPM | TEMPERATURE: 97.2 F | SYSTOLIC BLOOD PRESSURE: 122 MMHG | DIASTOLIC BLOOD PRESSURE: 70 MMHG | RESPIRATION RATE: 16 BRPM

## 2021-01-26 PROCEDURE — G0157 HHC PT ASSISTANT EA 15: HCPCS

## 2021-01-27 ENCOUNTER — HOME CARE VISIT (OUTPATIENT)
Dept: HOME HEALTH SERVICES | Facility: HOME HEALTH | Age: 61
End: 2021-01-27
Payer: COMMERCIAL

## 2021-02-03 ENCOUNTER — HOSPITAL ENCOUNTER (OUTPATIENT)
Dept: PHYSICAL THERAPY | Age: 61
Discharge: HOME | End: 2021-02-03
Payer: COMMERCIAL

## 2021-02-03 PROCEDURE — 97110 THERAPEUTIC EXERCISES: CPT

## 2021-02-03 PROCEDURE — 97162 PT EVAL MOD COMPLEX 30 MIN: CPT

## 2021-02-03 NOTE — PROGRESS NOTES
Kendall Cantu  : 1960  Primary: [facility]*  Secondary:  Therapy Center at [address], [address] [place] [address],[address], [facility].  [phone]   [phone]         OUTPATIENT PHYSICAL THERAPY: Daily Treatment Note 2/3/2021  Visit Count:  1    ICD-10: Treatment Diagnosis: Pain in left knee (M25.562); Stiffness of left knee, not elsewhere classified (M25.662); Presence of left artificial knee joint (R03.863); Difficulty in walking, not elsewhere classified (R26.2)  Precautions/Allergies:   Patient has no known allergies. TREATMENT PLAN:  Effective Dates: 2/3/2021 TO 2021 (90 days). Frequency/Duration: 2 times a week for 90 Day(s)    Pre-treatment Symptoms/Complaints:  L knee pain, weakness and decreased ROM  Pain: Initial:   1-2/10 Post Session:  1/10   Medications Last Reviewed:  2/3/2021  Updated Objective Findings:  See evaluation note from today  TREATMENT:     THERAPEUTIC EXERCISE: (20 minutes):  Exercises per grid below to improve mobility and strength. Required minimal verbal cues to promote proper body alignment and promote proper body posture. Progressed resistance and repetitions as indicated. MODALITIES: (10 minutes):      Cold pack to L knee x10 minutes to decrease soreness and swelling. Skin clear afterwards      Date:  21 Date:   Date:     Activity/Exercise Parameters Parameters Parameters   Quad Sets with Towel Roll Under Heel 20 reps  5 sec holds     SLR Flexion 20 reps     SAQ's 20 reps  5 sec holds     Heel Slides with Strap for OP 20 reps  5 sec holds     Gastroc Stretch with Strap 3 reps  20 sec holds     Seated Knee Flexion Stretch with R LE for OP 10 reps  10 sec holds     NuStep Level 3  6 minutes               [employer] Portal  Treatment/Session Summary:    · Response to Treatment:  Pt toelrated all treatments well today with min c/o L knee pain. ROM improved with treatments today.   · Communication/Consultation: None today  · Equipment provided today:  None today  · Recommendations/Intent for next treatment session: Next visit will focus on progression of ROM/strengthening L knee as tolerable.     Total Treatment Billable Duration:  20 minutes  PT Patient Time In/Time Out  Time In: 1100  Time Out: [address], PT    Future Appointments   Date Time Provider Nichole Calderon   5/12/2021  9:00 AM EM[facility] LAB [facility]

## 2021-02-03 NOTE — THERAPY EVALUATION
Bridger Tavares : 1960 Primary: [facility]* Secondary:  Therapy Center at [place] [id number] [facility], [address], [place] [address]. [phone]   [phone] OUTPATIENT PHYSICAL THERAPY:Initial Assessment 2/3/2021 ICD-10: Treatment Diagnosis: Pain in left knee (M25.562); Stiffness of left knee, not elsewhere classified (M25.662); Presence of left artificial knee joint (P44.751); Difficulty in walking, not elsewhere classified (R26.2) Precautions/Allergies:  
Patient has no known allergies. TREATMENT PLAN: 
Effective Dates: 2/3/2021 TO 2021 (90 days). Frequency/Duration: 2 times a week for 90 Day(s) MEDICAL/REFERRING DIAGNOSIS: 
left knee DATE OF ONSET: Surgery 21 REFERRING PHYSICIAN: Lara Frances MD MD Orders: Evaluate and Treat Return MD Appointment: 21 INITIAL ASSESSMENT:  Ms. Iain Starr presents with decreased L knee ROM, decreased L knee strength and increased pain leading to decreased functional status. Pt would benefit from skilled physical therapy services to address the above deficits and help patient return to prior level of function. PROBLEM LIST (Impacting functional limitations): 1. Decreased Strength 2. Decreased ADL/Functional Activities 3. Increased Pain 4. Decreased Flexibility/Joint Mobility 5. Decreased [place] with Home Exercise Program INTERVENTIONS PLANNED: (Treatment may consist of any combination of the following) 1. Balance Exercise 2. Cold 3. Cryotherapy 4. Gait Training 5. Home Exercise Program (HEP) 6. Manual Therapy 7. Range of Motion (ROM) 8. Therapeutic Exercise/Strengthening GOALS: (Goals have been discussed and agreed upon with patient.) Short-Term Functional Goals: Time Frame: 4 weeks 1. Pt will increase ROM L knee 0-120 degrees to assist with ascending/descending stairs and household ADL's 2.  Pt will increase strength L knee 4+/5 to assist with ascending/descending stairs and household ADL's 3. Pt will be independent with HEP Discharge Goals: Time Frame: 12 weeks 1. Pt will increase strength L knee 5/5 to assist with ascending/descending stairs and household ADL's 2. Pt will ascend/descend 10 eight-inch steps independently using a reciprocal pattern and min to no c/o L knee pain 3. Pt will perform 20 minutes household cleaning activities independently with min to n oc/o L knee pain OUTCOME MEASURE:  
Tool Used: Lower Extremity Functional Scale (LEFS) Score:  Initial: 55/80 Most Recent: X/80 (Date: -- ) Interpretation of Score: 20 questions each scored on a 5 point scale with 0 representing \"extreme difficulty or unable to perform\" and 4 representing \"no difficulty\". The lower the score, the greater the functional disability. 80/80 represents no disability. Minimal detectable change is 9 points. MEDICAL NECESSITY:  
· Patient is expected to demonstrate progress in strength, range of motion and functional technique to increase independence with ascending/descending stairs and household ADL's. · Patient demonstrates good rehab potential due to higher previous functional level. REASON FOR SERVICES/OTHER COMMENTS: 
· Patient continues to require skilled intervention due to decreased ROM/strength L knee with increased pain leading to decreased functional status. Total Duration: PT Patient Time In/Time Out Time In: 1100 Time Out: 1155 Rehabilitation Potential For Stated Goals: Good Regarding Rox Saint Cole's therapy, I certify that the treatment plan above will be carried out by a therapist or under their direction. Thank you for this referral, 
Nicole Dunbar PT Referring Physician Signature: Abby Lucio MD _______________________________ Date _____________ PAIN/SUBJECTIVE:  
Initial:   1-2/10 Post Session:  1/10 HISTORY:  
History of Injury/Illness (Reason for Referral): 
Pt reports tearing the meniscus in her L knee 15 years ago and had surgery with relief of pain until 5 years ago when she started having worsening of her knee pain. She went to orthopedist and was diagnosed with bone-on-bone arthritis. Pt had one gel injection with a couple months relief of pain. She then opted for Left TKA which was performed 01-07-21. Pt spent 1 night in hospital, then returned home. She has had home health physical therapy until 01-24-21. Pt lives with  in a two-story house with bedroom on first floor. She has 3 steps to enter with bilateral handrails. Pt rates her current L knee pain 1-2/10 with twinges of pain at times. She is taking Tylenol prn for her L knee pain. Pt does not work outside the home. Pt states she would like to be able to do yoga again and be able to get into the bathtub. Past Medical History/Comorbidities: Ms. Bjorn Love  has a past medical history of Arthritis, Constipation, High cholesterol, Postmenopausal, and Varicose vein of leg. Ms. Bjorn Love  has a past surgical history that includes hx hysterectomy (2002); hx breast biopsy (Left, 03/18/2013); hx cholecystectomy (12/19/2011); hx orthopaedic (Left, 03/20/2014); and hx knee arthroscopy (Left, 2005). Social History/Living Environment:  
  Pt lives with spouse in a two-story house Prior Level of Function/Work/Activity: 
Pt does not work outside the home Dominant Side:  
      RIGHT Other Clinical Tests: N/A Personal Factors:   
      Sex:  female Age:  56 y.o. Profession:  Pt does not work outside the home Ambulatory/Rehab Services H2 Model Falls Risk Assessment Risk Factors: 
     No Risk Factors Identified Ability to Rise from Chair: 
     (1)  Pushes up, successful in one attempt Falls Prevention Plan: No modifications necessary Total: (5 or greater = High Risk): 1 ©2010 [employer] States Patent #[id number].  Federal Law prohibits the replication, distribution or use without written permission from [facility] [employer] [facility] Current Medications:   
  
Current Outpatient Medications:  
  aspirin delayed-release 81 mg tablet, Take 1 Tab by mouth every twelve (12) hours every twelve (12) hours for 30 days. , Disp: 60 Tab, Rfl: 0 
  acyclovir (ZOVIRAX) 5 % topical cream, Apply  to affected area as needed (Cold Sores). , Disp: 5 g, Rfl: 1   pitavastatin calcium (Livalo) 4 mg tab tablet, Take 1 Tab by mouth nightly., Disp: 90 Tab, Rfl: 1   psyllium husk (METAMUCIL PO), Take 1 Dose by mouth every other day., Disp: , Rfl:   
Date Last Reviewed:  02-03-21 Number of Personal Factors/Comorbidities that affect the Plan of Care: 1-2: MODERATE COMPLEXITY EXAMINATION:  
Observation/Orthostatic Postural Assessment:   
      Incision healing well L knee Palpation:   
      Generalized tenderness L knee ROM:   
R knee extension = -5 degrees R knee flexion = 115 degrees L knee extension = -7 degrees L knee flexion = 68 degrees Strength:   
R knee extension = 5/5 R knee flexion = 5/5 L knee extension = 4/5 L knee flexion = 4/5 Special Tests: N/A Neurological Screen: 
      Sensation: Diminished sensation lateral L knee Functional Mobility:  
      Gait/Ambulation:  Pt walks with straight cane and moderate antalgic gait Transfers:  Pt able ot transition sit to supine and supine to sit independently Body Structures Involved: 1. Bones 2. Joints 3. Muscles Body Functions Affected: 1. Sensory/Pain 2. Neuromusculoskeletal Activities and Participation Affected: 1. Mobility 2. Domestic Life Number of elements (examined above) that affect the Plan of Care: 3: MODERATE COMPLEXITY CLINICAL PRESENTATION:  
Presentation: Evolving clinical presentation with changing clinical characteristics: MODERATE COMPLEXITY CLINICAL DECISION MAKING:  
Use of outcome tool(s) and clinical judgement create a POC that gives a: Questionable prediction of patient's progress: MODERATE COMPLEXITY

## 2021-02-09 ENCOUNTER — HOSPITAL ENCOUNTER (OUTPATIENT)
Dept: PHYSICAL THERAPY | Age: 61
Discharge: HOME | End: 2021-02-09
Payer: COMMERCIAL

## 2021-02-09 PROCEDURE — 97110 THERAPEUTIC EXERCISES: CPT

## 2021-02-09 NOTE — PROGRESS NOTES
Rafita Nolen  : 1960  Primary: [facility]*  Secondary:  Therapy Center at [place] [id number] [facility], [address], [place] [address].  [phone]   [phone]         OUTPATIENT PHYSICAL THERAPY: Daily Treatment Note 2021  Visit Count:  2    ICD-10: Treatment Diagnosis: Pain in left knee (M25.562); Stiffness of left knee, not elsewhere classified (M25.662); Presence of left artificial knee joint (F79.863); Difficulty in walking, not elsewhere classified (R26.2)  Precautions/Allergies:   Patient has no known allergies. TREATMENT PLAN:  Effective Dates: 2/3/2021 TO 2021 (90 days). Frequency/Duration: 2 times a week for 90 Day(s)    Pre-treatment Symptoms/Complaints:  L knee pain, weakness and decreased ROM; Pt states she has been doing her HEP as directed. Pain: Initial:   -2/10 Post Session:  1/10   Medications Last Reviewed:  2021  Updated Objective Findings:  L knee extension = -5 degrees, flexion = 100 degrees AAROM  TREATMENT:     THERAPEUTIC EXERCISE: (40 minutes):  Exercises per grid below to improve mobility and strength. Required minimal verbal cues to promote proper body alignment and promote proper body posture. Progressed resistance and repetitions as indicated. MODALITIES: (0 minutes):      Cold pack to L knee x10 minutes to decrease soreness and swelling.   Skin clear afterwards   MANUAL THERAPY: (5 minutes):  Manual OP into L knee flexion and extension   Date:  21 Date:  21 Date:     Activity/Exercise Parameters Parameters Parameters   Quad Sets with Towel Roll Under Heel 20 reps  5 sec holds 20 reps  5 sec holds    SLR Flexion 20 reps     SAQ's 20 reps  5 sec holds     Heel Slides with Strap for OP 20 reps  5 sec holds 20 reps  5 sec holds    Gastroc Stretch with Strap 3 reps  20 sec holds On Slantboard  3 reps  20 sec holds    Seated Knee Flexion Stretch with R LE for OP 10 reps  10 sec holds 10 reps  10 sec holds    NuStep Level 3  6 minutes Level 3  6 minutes    Standing Heel/Toe Raises  20 reps each    Step-Ups  6 inch step  20 reps    TKE with T-band  Green T-band  20 reps    Knee Flexion Stretch on Step  10 reps  10 sec holds    LAQ's  20 reps    Hamstring Stretch  3 reps  30 sec holds        [employer] Portal  Treatment/Session Summary:    · Response to Treatment:  Pt toelrated all treatments well today with min c/o L knee pain. Increased ROM L knee today. · Communication/Consultation:  None today  · Equipment provided today:  None today  · Recommendations/Intent for next treatment session: Next visit will focus on progression of ROM/strengthening L knee as tolerable.     Total Treatment Billable Duration:  40 minutes  PT Patient Time In/Time Out  Time In: 0845  Time Out: Morris Hoover PT    Future Appointments   Date Time Provider Nichole Calderon   2/11/2021  8:45 AM Wilhelmenia Sida, PT [facility]   2/15/2021  8:45 AM Wilhelmenia Sida, PT [facility]   2/17/2021  8:45 AM Wilhelmenia Sida, PT [facility]   2/22/2021  8:45 AM Wilhelmenia Sida, PT [facility]   2/24/2021  8:45 AM Wilhelmenia Sida, PT [facility]   5/12/2021  9:00 AM EMG LAB [facility] EMG

## 2021-02-11 ENCOUNTER — HOSPITAL ENCOUNTER (OUTPATIENT)
Dept: PHYSICAL THERAPY | Age: 61
Discharge: HOME | End: 2021-02-11
Payer: COMMERCIAL

## 2021-02-11 PROCEDURE — 97110 THERAPEUTIC EXERCISES: CPT

## 2021-02-11 PROCEDURE — 97140 MANUAL THERAPY 1/> REGIONS: CPT

## 2021-02-11 NOTE — PROGRESS NOTES
Claudia Sepulveda  : 1960  Primary: [facility]*  Secondary:  Therapy Center at [address] [place] [address] [address], [address], Nakul Mendez.  [phone]   [phone]         OUTPATIENT PHYSICAL THERAPY: Daily Treatment Note 2021  Visit Count:  2    ICD-10: Treatment Diagnosis: Pain in left knee (M25.562); Stiffness of left knee, not elsewhere classified (M25.662); Presence of left artificial knee joint (K55.636); Difficulty in walking, not elsewhere classified (R26.2)  Precautions/Allergies:   Patient has no known allergies. TREATMENT PLAN:  Effective Dates: 2/3/2021 TO 2021 (90 days). Frequency/Duration: 2 times a week for 90 Day(s)    Pre-treatment Symptoms/Complaints:  L knee pain, weakness and decreased ROM; Pt states her knee feels very stiff today. Pain: Initial:   -2/10 Post Session:  1/10   Medications Last Reviewed:  2021  Updated Objective Findings:  L knee extension = -5 degrees, flexion = 98 degrees AAROM  TREATMENT:     THERAPEUTIC EXERCISE: (35 minutes):  Exercises per grid below to improve mobility and strength. Required minimal verbal cues to promote proper body alignment and promote proper body posture. Progressed resistance and repetitions as indicated. MODALITIES: (0 minutes):      Cold pack to L knee x10 minutes to decrease soreness and swelling.   Skin clear afterwards   MANUAL THERAPY: (10 minutes):  Manual OP into L knee flexion and extension, scar massage to L knee   Date:  21 Date:  21 Date:  21   Activity/Exercise Parameters Parameters Parameters   Quad Sets with Towel Roll Under Heel 20 reps  5 sec holds 20 reps  5 sec holds 20 reps  5 sec holds   SLR Flexion 20 reps     SAQ's 20 reps  5 sec holds     Heel Slides with Strap for OP 20 reps  5 sec holds 20 reps  5 sec holds    Gastroc Stretch with Strap 3 reps  20 sec holds On Slantboard  3 reps  20 sec holds On Slantboard  3 reps  20 sec holds Seated Knee Flexion Stretch with R LE for OP 10 reps  10 sec holds 10 reps  10 sec holds 10 reps  10 sec holds   NuStep Level 3  6 minutes Level 3  6 minutes Level 3  7 minutes   Standing Heel/Toe Raises  20 reps each 20 reps each   Step-Ups  6 inch step  20 reps 6 inch step  20 reps   TKE with T-band  Green T-band  20 reps Green T-band  20 reps   Knee Flexion Stretch on Step  10 reps  10 sec holds 10 reps  10 sec holds   LAQ's  20 reps 20 reps   Hamstring Stretch  3 reps  30 sec holds 3 reps  20 sec holds       [employer] Portal  Treatment/Session Summary:    · Response to Treatment:  Pt toelrated all treatments well today with min c/o L knee pain. Increased stiffness L knee today. Pt declined ice and stated she would ice the knee at home. · Communication/Consultation:  None today  · Equipment provided today:  None today  · Recommendations/Intent for next treatment session: Next visit will focus on progression of ROM/strengthening L knee as tolerable.     Total Treatment Billable Duration:  45 minutes  PT Patient Time In/Time Out  Time In: 0845  Time Out: Morris Hoover, PT    Future Appointments   Date Time Provider Nichole Calderon   2/15/2021  8:45 AM Jose Antonio Parry, PT [facility] SFE   2/17/2021  8:45 AM Jose Antonio Parry, PT [facility]   2/22/2021  8:45 AM Jose Antonio Parry, PT [facility]   2/24/2021  8:45 AM Jose Antonio Parry, PT [facility]   5/12/2021  9:00 AM EMG LAB [facility] EMG

## 2021-02-15 ENCOUNTER — HOSPITAL ENCOUNTER (OUTPATIENT)
Dept: PHYSICAL THERAPY | Age: 61
Discharge: HOME | End: 2021-02-15
Payer: COMMERCIAL

## 2021-02-15 PROCEDURE — 97110 THERAPEUTIC EXERCISES: CPT

## 2021-02-15 PROCEDURE — 97140 MANUAL THERAPY 1/> REGIONS: CPT

## 2021-02-15 NOTE — PROGRESS NOTES
Cari Salcedo  : 1960  Primary: [facility] [place] [facility]*  Secondary:  [facility] [address], [address]  [phone]   [phone]         OUTPATIENT PHYSICAL THERAPY: Daily Treatment Note 2/15/2021  Visit Count:  3    ICD-10: Treatment Diagnosis: Pain in left knee (M25.562); Stiffness of left knee, not elsewhere classified (M25.662); Presence of left artificial knee joint (X27.847); Difficulty in walking, not elsewhere classified (R26.2)  Precautions/Allergies:   Patient has no known allergies. TREATMENT PLAN:  Effective Dates: 2/3/2021 TO 2021 (90 days). Frequency/Duration: 2 times a week for 90 Day(s)    Pre-treatment Symptoms/Complaints:  L knee pain, weakness and decreased ROM; Pt states her knee feels good this morning. She states she worked hard on her knee bend over the weekend. Pain: Initial:   0/10 Post Session:  1/10   Medications Last Reviewed:  2/15/2021  Updated Objective Findings:  L knee extension = -5 degrees, flexion = 105 degrees AAROM  TREATMENT:     THERAPEUTIC EXERCISE: (35 minutes):  Exercises per grid below to improve mobility and strength. Required minimal verbal cues to promote proper body alignment and promote proper body posture. Progressed resistance and repetitions as indicated. MODALITIES: (10 minutes):      [employer] cold pack to L knee x10 minutes to decrease soreness and swelling.   Skin clear afterwards   MANUAL THERAPY: (10 minutes):  Manual OP into L knee flexion and extension, scar massage to L knee   Date:  21 Date:  21 Date:  21 Date:  02-15-21   Activity/Exercise Parameters Parameters Parameters    Quad Sets with Towel Roll Under Heel 20 reps  5 sec holds 20 reps  5 sec holds 20 reps  5 sec holds 20 reps  5 sec holds   SLR Flexion 20 reps      SAQ's 20 reps  5 sec holds      Heel Slides with Strap for OP 20 reps  5 sec holds 20 reps  5 sec holds  20 reps  5 sec holds   Gastroc Stretch with Strap 3 reps  20 sec holds On Slantboard  3 reps  20 sec holds On Slantboard  3 reps  20 sec holds On Slantboard  3 reps  20 sec holds   Seated Knee Flexion Stretch with R LE for OP 10 reps  10 sec holds 10 reps  10 sec holds 10 reps  10 sec holds 10 reps  10 sec holds   NuStep Level 3  6 minutes Level 3  6 minutes Level 3  7 minutes Level 3  8 minutes   Standing Heel/Toe Raises  20 reps each 20 reps each 20 reps each   Step-Ups  6 inch step  20 reps 6 inch step  20 reps 6 inch step  20 reps   TKE with T-band  Green T-band  20 reps Green T-band  20 reps Blue t-band  20 reps   Knee Flexion Stretch on Step  10 reps  10 sec holds 10 reps  10 sec holds 10 reps  10 sec holds   LAQ's  20 reps 20 reps 20 reps   Hamstring Stretch  3 reps  30 sec holds 3 reps  20 sec holds 3 reps  20 sec holds       [employer] Portal  Treatment/Session Summary:    · Response to Treatment:  Pt toelrated all treatments well today with min c/o L knee pain. Increased ROM L knee today. · Communication/Consultation:  None today  · Equipment provided today:  None today  · Recommendations/Intent for next treatment session: Next visit will focus on progression of ROM/strengthening L knee as tolerable.     Total Treatment Billable Duration:  45 minutes  PT Patient Time In/Time Out  Time In: 0845  Time Out: Paco Hutchison 42, PT    Future Appointments   Date Time Provider Nichole Calderon   2/17/2021  8:45 AM Marivel Caceres, PT [facility]   2/22/2021  8:45 AM Marivelritika Caceres, PT [facility]   2/24/2021  8:45 AM Marivel Caceres, PT [facility] SFE   5/12/2021  9:00 AM EMG LAB [facility] EMG

## 2021-02-17 ENCOUNTER — HOSPITAL ENCOUNTER (OUTPATIENT)
Dept: PHYSICAL THERAPY | Age: 61
Discharge: HOME | End: 2021-02-17
Payer: COMMERCIAL

## 2021-02-17 PROCEDURE — 97110 THERAPEUTIC EXERCISES: CPT

## 2021-02-17 PROCEDURE — 97140 MANUAL THERAPY 1/> REGIONS: CPT

## 2021-02-17 NOTE — PROGRESS NOTES
Cecile Blanco  : 1960  Primary: [facility]*  Secondary:  Therapy Center at [place] [address] [facility], [address], [place] [address].  [phone]   [phone]         OUTPATIENT PHYSICAL THERAPY: Daily Treatment Note 2021  Visit Count:  4    ICD-10: Treatment Diagnosis: Pain in left knee (M25.562); Stiffness of left knee, not elsewhere classified (M25.662); Presence of left artificial knee joint (P60.263); Difficulty in walking, not elsewhere classified (R26.2)  Precautions/Allergies:   Patient has no known allergies. TREATMENT PLAN:  Effective Dates: 2/3/2021 TO 2021 (90 days). Frequency/Duration: 2 times a week for 90 Day(s)    Pre-treatment Symptoms/Complaints:  L knee pain, weakness and decreased ROM; Pt states her knee feels a little stiff and swollen. Pain: Initial:   0/10 Post Session:  1/10   Medications Last Reviewed:  2021  Updated Objective Findings:  L knee extension = -5 degrees, flexion = 101 degrees AAROM  TREATMENT:     THERAPEUTIC EXERCISE: (35 minutes):  Exercises per grid below to improve mobility and strength. Required minimal verbal cues to promote proper body alignment and promote proper body posture. Progressed resistance and repetitions as indicated. MODALITIES: (0 minutes):      Game Ready cold pack to L knee x10 minutes to decrease soreness and swelling.   Skin clear afterwards   MANUAL THERAPY: (10 minutes):  Manual OP into L knee flexion and extension, scar massage to L knee   Date:  21 Date:  02-15-21 Date:  21   Activity/Exercise Parameters     Quad Sets with Towel Roll Under Heel 20 reps  5 sec holds 20 reps  5 sec holds 20 reps  5 sec holds   SLR Flexion      SAQ's      Heel Slides with Strap for OP  20 reps  5 sec holds 20 reps  5 sec holds   Gastroc Stretch with Strap On Slantboard  3 reps  20 sec holds On Slantboard  3 reps  20 sec holds On Slantboard  3 reps  20 sec holds   Seated Knee Flexion Stretch with R LE for OP 10 reps  10 sec holds 10 reps  10 sec holds 10 reps  10 sec holds   NuStep Level 3  7 minutes Level 3  8 minutes Level 3  8 minutes   Standing Heel/Toe Raises 20 reps each 20 reps each 20 reps each   Step-Ups 6 inch step  20 reps 6 inch step  20 reps 6 inch step  20 reps   TKE with T-band Green T-band  20 reps Blue t-band  20 reps Black T-band  20 reps   Knee Flexion Stretch on Step 10 reps  10 sec holds 10 reps  10 sec holds 10 reps  10 sec holds   LAQ's 20 reps 20 reps 1 Lb  20 reps   Hamstring Stretch 3 reps  20 sec holds 3 reps  20 sec holds 3 reps  20 sec holds       [employer] Portal  Treatment/Session Summary:    · Response to Treatment:  Pt toelrated all treatments well today with min c/o L knee pain. Increased stiffness L knee this morning. Pt declined ice at end of session. · Communication/Consultation:  None today  · Equipment provided today:  None today  · Recommendations/Intent for next treatment session: Next visit will focus on progression of ROM/strengthening L knee as tolerable.     Total Treatment Billable Duration:  45 minutes  PT Patient Time In/Time Out  Time In: 0845  Time Out: Morris Hoover PT    Future Appointments   Date Time Provider Nichole Calderon   2/22/2021  8:45 AM [facility]   2/24/2021  8:45 AM JAIME Sanchez E   5/12/2021  9:00 AM EMG LAB [facility] EMG

## 2021-02-22 ENCOUNTER — HOSPITAL ENCOUNTER (OUTPATIENT)
Dept: PHYSICAL THERAPY | Age: 61
Discharge: HOME | End: 2021-02-22
Payer: COMMERCIAL

## 2021-02-22 PROCEDURE — 97140 MANUAL THERAPY 1/> REGIONS: CPT

## 2021-02-22 PROCEDURE — 97110 THERAPEUTIC EXERCISES: CPT

## 2021-02-22 NOTE — PROGRESS NOTES
Cory Soliman  : 1960  Primary: [facility]*  Secondary:  Therapy Center at [address] [place] [address], [address], [address] [place] [address]  [phone]   [phone]         OUTPATIENT PHYSICAL THERAPY: Daily Treatment Note 2021  Visit Count:  5    ICD-10: Treatment Diagnosis: Pain in left knee (M25.562); Stiffness of left knee, not elsewhere classified (M25.662); Presence of left artificial knee joint (E60.284); Difficulty in walking, not elsewhere classified (R26.2)  Precautions/Allergies:   Patient has no known allergies. TREATMENT PLAN:  Effective Dates: 2/3/2021 TO 2021 (90 days). Frequency/Duration: 2 times a week for 90 Day(s)    Pre-treatment Symptoms/Complaints:  L knee pain, weakness and decreased ROM; Pt states she had some bruising after the scar massage last visit, but it is better today. Pain: Initial:   0/10 Post Session:  1/10   Medications Last Reviewed:  2021  Updated Objective Findings:  L knee extension = -5 degrees, flexion = 103 degrees AAROM  TREATMENT:     THERAPEUTIC EXERCISE: (35 minutes):  Exercises per grid below to improve mobility and strength. Required minimal verbal cues to promote proper body alignment and promote proper body posture. Progressed resistance and repetitions as indicated. MODALITIES: (0 minutes):      Game Ready cold pack to L knee x10 minutes to decrease soreness and swelling.   Skin clear afterwards   MANUAL THERAPY: (10 minutes):  Manual OP into L knee flexion and extension, scar massage to L knee   Date:  21 Date:  02-15-21 Date:  21 Date:  21   Activity/Exercise Parameters      Quad Sets with Towel Roll Under Heel 20 reps  5 sec holds 20 reps  5 sec holds 20 reps  5 sec holds 20 reps  5 sec holds   SLR Flexion       SAQ's       Heel Slides with Strap for OP  20 reps  5 sec holds 20 reps  5 sec holds 20 reps  5 sec holds   Gastroc Stretch with Strap On Slantboard  3 reps  20 sec holds On Slantboard  3 reps  20 sec holds On Slantboard  3 reps  20 sec holds On Slantboard  3 reps  20 sec holds   Seated Knee Flexion Stretch with R LE for OP 10 reps  10 sec holds 10 reps  10 sec holds 10 reps  10 sec holds 10 reps  10 sec holds   NuStep Level 3  7 minutes Level 3  8 minutes Level 3  8 minutes Level 3  8 minutes   Standing Heel/Toe Raises 20 reps each 20 reps each 20 reps each 20 reps each   Step-Ups 6 inch step  20 reps 6 inch step  20 reps 6 inch step  20 reps 6 inch step  20 reps   TKE with T-band Green T-band  20 reps Blue t-band  20 reps Black T-band  20 reps Black T-band  20 reps   Knee Flexion Stretch on Step 10 reps  10 sec holds 10 reps  10 sec holds 10 reps  10 sec holds 10 reps  10 sec holds   LAQ's 20 reps 20 reps 1 Lb  20 reps 2 Lbs  20 reps   Hamstring Stretch 3 reps  20 sec holds 3 reps  20 sec holds 3 reps  20 sec holds 3 reps  20 sec holds   Step Downs    6 inch step  15 reps       MedBridge Portal  Treatment/Session Summary:    · Response to Treatment:  Pt toelrated all treatments well today with min c/o L knee pain. Pt declined ice at end of session. Increased ROM L knee today compared to last visit. · Communication/Consultation:  None today  · Equipment provided today:  None today  · Recommendations/Intent for next treatment session: Next visit will focus on progression of ROM/strengthening L knee as tolerable.     Total Treatment Billable Duration:  45 minutes  PT Patient Time In/Time Out  Time In: 0845  Time Out: Morris Hoover PT    Future Appointments   Date Time Provider Nichole Calderon   2/24/2021  8:45 AM Jaxon Retana PT [facility]   5/12/2021  9:00 AM EMG LAB [facility]G

## 2021-02-24 ENCOUNTER — HOSPITAL ENCOUNTER (OUTPATIENT)
Dept: PHYSICAL THERAPY | Age: 61
Discharge: HOME | End: 2021-02-24
Payer: COMMERCIAL

## 2021-02-24 PROCEDURE — 97110 THERAPEUTIC EXERCISES: CPT

## 2021-02-24 PROCEDURE — 97140 MANUAL THERAPY 1/> REGIONS: CPT

## 2021-02-24 NOTE — PROGRESS NOTES
Allegra Amador  : 1960  Primary: [facility]*  Secondary:  Therapy Center at [address] [place] [address], [address], [place] [address].  [phone]   [phone]         OUTPATIENT PHYSICAL THERAPY: Daily Treatment Note 2021  Visit Count:  6    ICD-10: Treatment Diagnosis: Pain in left knee (M25.562); Stiffness of left knee, not elsewhere classified (M25.662); Presence of left artificial knee joint (Z67.899); Difficulty in walking, not elsewhere classified (R26.2)  Precautions/Allergies:   Patient has no known allergies. TREATMENT PLAN:  Effective Dates: 2/3/2021 TO 2021 (90 days). Frequency/Duration: 2 times a week for 90 Day(s)    Pre-treatment Symptoms/Complaints:  L knee pain, weakness and decreased ROM; Pt states she had some bruising after the scar massage last visit, but it is better today. Pain: Initial:   0/10 Post Session:  1/10   Medications Last Reviewed:  2021  Updated Objective Findings:  L knee extension = -5 degrees, flexion = 103 degrees AAROM  TREATMENT:     THERAPEUTIC EXERCISE: (25 minutes):  Exercises per grid below to improve mobility and strength. Required minimal verbal cues to promote proper body alignment and promote proper body posture. Progressed resistance and repetitions as indicated. MODALITIES: (10 minutes):      Game Ready cold pack to L knee x10 minutes to decrease soreness and swelling.   Skin clear afterwards   MANUAL THERAPY: (20 minutes):  Manual OP into L knee flexion and extension, scar massage to L knee   Date:  21 Date:  02-15-21 Date:  21 Date:  21 Date:  21   Activity/Exercise Parameters       Quad Sets with Towel Roll Under Heel 20 reps  5 sec holds 20 reps  5 sec holds 20 reps  5 sec holds 20 reps  5 sec holds 20 reps  5 sec holds   SLR Flexion        SAQ's        Heel Slides with Strap for OP  20 reps  5 sec holds 20 reps  5 sec holds 20 reps  5 sec holds 20 reps  5 sec holds   Gastroc Stretch with Strap On Slantboard  3 reps  20 sec holds On Slantboard  3 reps  20 sec holds On Slantboard  3 reps  20 sec holds On Slantboard  3 reps  20 sec holds On Slantboard  3 reps  20 sec holds   Seated Knee Flexion Stretch with R LE for OP 10 reps  10 sec holds 10 reps  10 sec holds 10 reps  10 sec holds 10 reps  10 sec holds 15 reps  10 sec holds   NuStep Level 3  7 minutes Level 3  8 minutes Level 3  8 minutes Level 3  8 minutes Level 4  9 minutes   Standing Heel/Toe Raises 20 reps each 20 reps each 20 reps each 20 reps each    Step-Ups 6 inch step  20 reps 6 inch step  20 reps 6 inch step  20 reps 6 inch step  20 reps    TKE with T-band Green T-band  20 reps Blue t-band  20 reps Black T-band  20 reps Black T-band  20 reps Black T-band  20 reps   Knee Flexion Stretch on Step 10 reps  10 sec holds 10 reps  10 sec holds 10 reps  10 sec holds 10 reps  10 sec holds 15 reps  10 sec holds   LAQ's 20 reps 20 reps 1 Lb  20 reps 2 Lbs  20 reps    Hamstring Stretch 3 reps  20 sec holds 3 reps  20 sec holds 3 reps  20 sec holds 3 reps  20 sec holds    Step Downs    6 inch step  15 reps    Squats in Parallel Bars     20 reps   Nautilus Leg Press     85 Lbs  20 reps       MedBridge Portal  Treatment/Session Summary:    · Response to Treatment:  Pt toelrated all treatments well today with min c/o L knee pain. Performed all flexion exercises first, then extension and seemed to do better today. · Communication/Consultation:  None today  · Equipment provided today:  None today  · Recommendations/Intent for next treatment session: Next visit will focus on progression of ROM/strengthening L knee as tolerable.     Total Treatment Billable Duration:  45 minutes  PT Patient Time In/Time Out  Time In: 0845  Time Out: [address], PT    Future Appointments   Date Time Provider Nichole Calderon   3/1/2021  1:45 PM Raz Cerna, PT [facility]   3/4/2021  8:45 AM Raz Cerna, PT [facility] 3/8/2021  8:45 AM Wilhelmenia Sida, PT [facility]   3/11/2021  8:45 AM Wilhelmenia Sida, PT [facility]   3/15/2021  8:45 AM Wilhelmenia Sida, PT [facility]   3/18/2021  8:45 AM Wilhelmenia Sida, PT [facility]   5/12/2021  9:00 AM EMG LAB [facility]G

## 2021-03-01 ENCOUNTER — HOSPITAL ENCOUNTER (OUTPATIENT)
Dept: PHYSICAL THERAPY | Age: 61
Discharge: HOME | End: 2021-03-01
Payer: COMMERCIAL

## 2021-03-01 PROCEDURE — 97110 THERAPEUTIC EXERCISES: CPT

## 2021-03-01 PROCEDURE — 97140 MANUAL THERAPY 1/> REGIONS: CPT

## 2021-03-01 NOTE — PROGRESS NOTES
Neeta Juaquin  : 1960  Primary: [facility]*  Secondary:  Therapy Center at [place] [address], [address], [place] [address].  [phone]   [phone]         OUTPATIENT PHYSICAL THERAPY: Daily Treatment Note 3/1/2021  Visit Count:  7    ICD-10: Treatment Diagnosis: Pain in left knee (M25.562); Stiffness of left knee, not elsewhere classified (M25.662); Presence of left artificial knee joint (L94.654); Difficulty in walking, not elsewhere classified (R26.2)  Precautions/Allergies:   Patient has no known allergies. TREATMENT PLAN:  Effective Dates: 2/3/2021 TO 2021 (90 days). Frequency/Duration: 2 times a week for 90 Day(s)    Pre-treatment Symptoms/Complaints:  L knee pain, weakness and decreased ROM; Pt states she did well after her last visit. Pain: Initial:   010 Post Session:  1/10   Medications Last Reviewed:  3/1/2021  Updated Objective Findings:  L knee extension = -5 degrees, flexion = 105 degrees AAROM  TREATMENT:     THERAPEUTIC EXERCISE: (25 minutes):  Exercises per grid below to improve mobility and strength. Required minimal verbal cues to promote proper body alignment and promote proper body posture. Progressed resistance and repetitions as indicated. MODALITIES: (0 minutes):      Game Ready cold pack to L knee x10 minutes to decrease soreness and swelling.   Skin clear afterwards   MANUAL THERAPY: (20 minutes):  Manual OP into L knee flexion and extension, scar massage to L knee   Date:  21 Date:  21 Date:  21 Date:  21   Activity/Exercise       Quad Sets with Towel Roll Under Heel 20 reps  5 sec holds 20 reps  5 sec holds 20 reps  5 sec holds 20 reps  5 sec holds   SLR Flexion       SAQ's       Heel Slides with Strap for OP 20 reps  5 sec holds 20 reps  5 sec holds 20 reps  5 sec holds 20 reps  5 sec holds   Gastroc Stretch with Strap On Slantboard  3 reps  20 sec holds On Slantboard  3 reps  20 sec holds On Slantboard  3 reps  20 sec holds On Slantboard  3 reps  20 sec holds   Seated Knee Flexion Stretch with R LE for OP 10 reps  10 sec holds 10 reps  10 sec holds 15 reps  10 sec holds 15 reps  15 sec holds   NuStep Level 3  8 minutes Level 3  8 minutes Level 4  9 minutes Level 4  9 minutes   Standing Heel/Toe Raises 20 reps each 20 reps each     Step-Ups 6 inch step  20 reps 6 inch step  20 reps     TKE with T-band Black T-band  20 reps Black T-band  20 reps Black T-band  20 reps Black T-band  20 reps   Knee Flexion Stretch on Step 10 reps  10 sec holds 10 reps  10 sec holds 15 reps  10 sec holds 15 reps  15 sec holds   LAQ's 1 Lb  20 reps 2 Lbs  20 reps     Hamstring Stretch 3 reps  20 sec holds 3 reps  20 sec holds     Step Downs  6 inch step  15 reps     Squats in Parallel Bars   20 reps 20 reps   Nautilus Leg Press   85 Lbs  20 reps 85 lbs  20 reps       MedBridge Portal  Treatment/Session Summary:    · Response to Treatment:  Pt toelrated all treatments well today with min c/o L knee pain. Performed all flexion exercises first, then extension and seemed to do better today. Increased ROM L knee this afternoon. · Communication/Consultation:  None today  · Equipment provided today:  None today  · Recommendations/Intent for next treatment session: Next visit will focus on progression of ROM/strengthening L knee as tolerable.     Total Treatment Billable Duration:  45 minutes  PT Patient Time In/Time Out  Time In: 4060  Time Out: [address] [facility], PT    Future Appointments   Date Time Provider Nichole Calderon   3/4/2021  8:45 AM Dover Fearing, PT [facility]   3/8/2021  8:45 AM Dover Fearing, PT [facility]   3/11/2021  8:45 AM Macho Fearing, PT [facility]   3/15/2021  8:45 AM Dover Fearing, PT [facility]   3/18/2021  8:45 AM Macho Fearing, PT [facility]   5/12/2021  9:00 AM EMG LAB [facility]G

## 2021-03-04 ENCOUNTER — HOSPITAL ENCOUNTER (OUTPATIENT)
Dept: PHYSICAL THERAPY | Age: 61
Discharge: HOME | End: 2021-03-04
Payer: COMMERCIAL

## 2021-03-04 PROCEDURE — 97110 THERAPEUTIC EXERCISES: CPT

## 2021-03-04 PROCEDURE — 97140 MANUAL THERAPY 1/> REGIONS: CPT

## 2021-03-04 NOTE — PROGRESS NOTES
Jasper Colunga  : 1960  Primary: [facility]*  Secondary:  Therapy Center at [place] [address], [address] [facility]  [phone]   [phone]         OUTPATIENT PHYSICAL THERAPY: Daily Treatment Note 3/4/2021  Visit Count:  8    ICD-10: Treatment Diagnosis: Pain in left knee (M25.562); Stiffness of left knee, not elsewhere classified (M25.662); Presence of left artificial knee joint (O36.575); Difficulty in walking, not elsewhere classified (R26.2)  Precautions/Allergies:   Patient has no known allergies. TREATMENT PLAN:  Effective Dates: 2/3/2021 TO 2021 (90 days). Frequency/Duration: 2 times a week for 90 Day(s)    Pre-treatment Symptoms/Complaints:  L knee pain, weakness and decreased ROM; Pt states her L knee feels a little stiff this morning. She states she has been using her bike at home. Pain: Initial:   0/10 Post Session:  1/10   Medications Last Reviewed:  3/4/2021  Updated Objective Findings:  L knee extension = -5 degrees, flexion = 104 degrees AAROM  TREATMENT:     THERAPEUTIC EXERCISE: (25 minutes):  Exercises per grid below to improve mobility and strength. Required minimal verbal cues to promote proper body alignment and promote proper body posture. Progressed resistance and repetitions as indicated. MODALITIES: (0 minutes):      Game Ready cold pack to L knee x10 minutes to decrease soreness and swelling.   Skin clear afterwards   MANUAL THERAPY: (20 minutes):  Manual OP into L knee flexion and extension, scar massage to L knee   Date:  21 Date:  21 Date:  21 Date:  21 Date:  21   Activity/Exercise        Quad Sets with Towel Roll Under Heel 20 reps  5 sec holds 20 reps  5 sec holds 20 reps  5 sec holds 20 reps  5 sec holds 20 reps  5 sec holds   SLR Flexion        SAQ's        Heel Slides with Strap for OP 20 reps  5 sec holds 20 reps  5 sec holds 20 reps  5 sec holds 20 reps  5 sec holds 20 reps  5 sec holds   Gastroc Stretch with Strap On Slantboard  3 reps  20 sec holds On Slantboard  3 reps  20 sec holds On Slantboard  3 reps  20 sec holds On Slantboard  3 reps  20 sec holds On Slantboard  3 reps  20 sec holds   Seated Knee Flexion Stretch with R LE for OP 10 reps  10 sec holds 10 reps  10 sec holds 15 reps  10 sec holds 15 reps  15 sec holds 15 reps  15 sec holds   NuStep Level 3  8 minutes Level 3  8 minutes Level 4  9 minutes Level 4  9 minutes Level 4  9 minutes   Standing Heel/Toe Raises 20 reps each 20 reps each      Step-Ups 6 inch step  20 reps 6 inch step  20 reps      TKE with T-band Black T-band  20 reps Black T-band  20 reps Black T-band  20 reps Black T-band  20 reps Black T-band  20 reps   Knee Flexion Stretch on Step 10 reps  10 sec holds 10 reps  10 sec holds 15 reps  10 sec holds 15 reps  15 sec holds 15 reps  15 sec holds   LAQ's 1 Lb  20 reps 2 Lbs  20 reps      Hamstring Stretch 3 reps  20 sec holds 3 reps  20 sec holds      Step Downs  6 inch step  15 reps      Squats in Parallel Bars   20 reps 20 reps 20 reps   Nautilus Leg Press   85 Lbs  20 reps 85 lbs  20 reps 85 Lbs  20 reps       MedBridge Portal  Treatment/Session Summary:    · Response to Treatment:  Pt toelrated all treatments well today with min c/o L knee pain. Performed all flexion exercises first, then extension and seemed to do better today. ROM L knee flexion today = 104 degrees. Progress note next visit. · Communication/Consultation:  None today  · Equipment provided today:  None today  · Recommendations/Intent for next treatment session: Next visit will focus on progression of ROM/strengthening L knee as tolerable.     Total Treatment Billable Duration:  45 minutes  PT Patient Time In/Time Out  Time In: 0845  Time Out: Morris Hoover, PT    Future Appointments   Date Time Provider Nichole Calderon   3/8/2021  8:45 AM Vania Butler, PT [facility]   3/11/2021  8:45 AM Vania Butler PT [facility]   3/15/2021  8:45 AM Shaan Lash, PT [facility]   3/18/2021  8:45 AM Shaan Lash, PT [facility]   5/12/2021  9:00 AM EMG LAB [facility] EMG

## 2021-03-08 ENCOUNTER — HOSPITAL ENCOUNTER (OUTPATIENT)
Dept: PHYSICAL THERAPY | Age: 61
Discharge: HOME | End: 2021-03-08
Payer: COMMERCIAL

## 2021-03-08 PROCEDURE — 97140 MANUAL THERAPY 1/> REGIONS: CPT

## 2021-03-08 PROCEDURE — 97110 THERAPEUTIC EXERCISES: CPT

## 2021-03-08 NOTE — PROGRESS NOTES
Hieu Damon  : 1960  Primary: Candance [facility]*  Secondary:  Therapy Center at [address] [place] [address], Suite [facility], [address] [place] [address]  [phone]   [phone]         OUTPATIENT PHYSICAL THERAPY: Daily Treatment Note 3/8/2021  Visit Count:  9    ICD-10: Treatment Diagnosis: Pain in left knee (M25.562); Stiffness of left knee, not elsewhere classified (M25.662); Presence of left artificial knee joint (S25.314); Difficulty in walking, not elsewhere classified (R26.2)  Precautions/Allergies:   Patient has no known allergies. TREATMENT PLAN:  Effective Dates: 2/3/2021 TO 2021 (90 days). Frequency/Duration: 2 times a week for 90 Day(s)    Pre-treatment Symptoms/Complaints:  L knee pain, weakness and decreased ROM; Pt states her L knee feels a little stiff this morning. \" She states she has been using her bike at home this weekend, it is stiff at first but I keep going and take some breaks\". \"No pain just stiffness or discomfort\". \"I did some yard work yesterday and I was sore\". Pain: Initial:   010 Post Session:  1/10   Medications Last Reviewed:  3/8/2021  Updated Objective Findings:  L knee extension = -5 degrees, flexion = 107 degrees AAROM  TREATMENT:     THERAPEUTIC EXERCISE: (25 minutes):  Exercises per grid below to improve mobility and strength. Required minimal verbal cues to promote proper body alignment and promote proper body posture. Progressed resistance and repetitions as indicated. MODALITIES: (0 minutes):      Game Ready cold pack to L knee x10 minutes to decrease soreness and swelling.   Skin clear afterwards   MANUAL THERAPY: (20 minutes):  Manual OP into L knee flexion and extension, scar massage to L knee   Date:  21   Activity/Exercise    Quad Sets with Towel Roll Under Heel 20 reps  5 sec holds   SLR Flexion x   SAQ's x   Heel Slides with Strap for OP 20 reps  5 sec holds   Gastroc Stretch with Strap On Slantboard  3 reps  20 sec holds   Seated Knee Flexion Stretch with R LE for OP 15 reps  15 sec holds   NuStep Level 4  8 minutes   Standing Heel/Toe Raises x   Step-Ups 15 reps   TKE with T-band Black T-band  20 reps   Knee Flexion Stretch on Step 15 reps  15 sec holds   LAQ's x   Hamstring Stretch x   Step Downs x   Squats in Parallel Bars 20 reps   Nautilus Leg Press 85 Lbs  20 reps       MedBridge Portal  Treatment/Session Summary:    · Response to Treatment:  Pt toelrated all treatments well today with min c/o L knee pain. Increased ROM today. Progress note next visit. · Communication/Consultation:  None today  · Equipment provided today:  None today  · Recommendations/Intent for next treatment session: Next visit will focus on progression of ROM/strengthening L knee as tolerable.     Total Treatment Billable Duration:  45 minutes  PT Patient Time In/Time Out  Time In: 0845  Time Out: 0930  Masha Tucker PTA    Future Appointments   Date Time Provider Nichole Calderon   3/8/2021  8:45 AM Layne Ryan PTA [facility]   3/11/2021  8:45 AM Richerd Organ, PT [facility]   3/15/2021  8:45 AM Richerd Organ, PT [facility]   3/18/2021  8:45 AM Richerd Organ, PT [facility]   5/12/2021  9:00 AM EMG LAB [facility] EMG

## 2021-03-11 ENCOUNTER — HOSPITAL ENCOUNTER (OUTPATIENT)
Dept: PHYSICAL THERAPY | Age: 61
Discharge: HOME | End: 2021-03-11
Payer: COMMERCIAL

## 2021-03-11 PROCEDURE — 97110 THERAPEUTIC EXERCISES: CPT

## 2021-03-11 PROCEDURE — 97140 MANUAL THERAPY 1/> REGIONS: CPT

## 2021-03-11 NOTE — PROGRESS NOTES
Tenzin Coe  : 1960  Primary: Jayda Husain [facility]*  Secondary:  Therapy Center at [place] [address], [address], [place] [address].  [phone]   [phone]         OUTPATIENT PHYSICAL THERAPY: Daily Treatment Note 3/11/2021  Visit Count:  10    ICD-10: Treatment Diagnosis: Pain in left knee (M25.562); Stiffness of left knee, not elsewhere classified (M25.662); Presence of left artificial knee joint (T12.425); Difficulty in walking, not elsewhere classified (R26.2)  Precautions/Allergies:   Patient has no known allergies. TREATMENT PLAN:  Effective Dates: 2/3/2021 TO 2021 (90 days). Frequency/Duration: 2 times a week for 90 Day(s)    Pre-treatment Symptoms/Complaints:  L knee pain, weakness and decreased ROM; Pt states her knee is stiff and sore from working in the yard a lot yesterday. Pain: Initial:   0/10 Post Session:  1/10   Medications Last Reviewed:  3/11/2021  Updated Objective Findings:  L knee extension = -5 degrees, flexion = 109 degrees AAROM  TREATMENT:     THERAPEUTIC EXERCISE: (25 minutes):  Exercises per grid below to improve mobility and strength. Required minimal verbal cues to promote proper body alignment and promote proper body posture. Progressed resistance and repetitions as indicated. MODALITIES: (0 minutes):      Game Ready cold pack to L knee x10 minutes to decrease soreness and swelling.   Skin clear afterwards   MANUAL THERAPY: (20 minutes):  Manual OP into L knee flexion and extension, scar massage to L knee   Date:  21 Date:  21   Activity/Exercise     Quad Sets with Towel Roll Under Heel 20 reps  5 sec holds 20 reps  5 sec holds   SLR Flexion x    SAQ's x    Heel Slides with Strap for OP 20 reps  5 sec holds 20 reps  5 sec holds   Gastroc Stretch with Strap On Slantboard  3 reps  20 sec holds On Slantboard  3 reps  20 sec holds   Seated Knee Flexion Stretch with R LE for OP 15 reps  15 sec holds 15 reps  15 sec holds   NuStep Level 4  8 minutes Level 4  8 minutes   Standing Heel/Toe Raises x    Step-Ups 15 reps    TKE with T-band Black T-band  20 reps Black T-band  20 reps   Knee Flexion Stretch on Step 15 reps  15 sec holds 15 reps  15 sec holds   LAQ's x    Hamstring Stretch x    Step Downs x    Squats in Parallel Bars 20 reps 20 reps   Nautilus Leg Press 85 Lbs  20 reps 85 Lbs  20 reps       MedBridge Portal  Treatment/Session Summary:    · Response to Treatment:  Pt toelrated all treatments well today with min c/o L knee pain. ROM L knee continues to slowly improve. · Communication/Consultation:  None today  · Equipment provided today:  None today  · Recommendations/Intent for next treatment session: Next visit will focus on progression of ROM/strengthening L knee as tolerable.     Total Treatment Billable Duration:  45 minutes  PT Patient Time In/Time Out  Time In: 0845  Time Out: Morris Hoover PT    Future Appointments   Date Time Provider Nichole Calderon   3/15/2021  8:45 AM Adriana Shelley PT [facility]   3/18/2021  8:45 AM Adriana Shelley PT [facility]   3/22/2021  8:00 AM [facility]   3/25/2021  8:45 AM Adriana Shelley PT [facility]   5/12/2021  9:00 AM EMG LAB [facility] EMG

## 2021-03-11 NOTE — PROGRESS NOTES
Bridger Tavares  : 1960  Primary: Izaiah Carrollk Of [facility]*  Secondary:  [facility]rapy Center at [facility], [address] [facility], [facility].  [phone]   [phone]           OUTPATIENT PHYSICAL THERAPY:Progress Report 3/11/2021   ICD-10: Treatment Diagnosis: Pain in left knee (M25.562); Stiffness of left knee, not elsewhere classified (M25.662); Presence of left artificial knee joint (S29.868); Difficulty in walking, not elsewhere classified (R26.2)  Precautions/Allergies:   Patient has no known allergies. TREATMENT PLAN:  Effective Dates: 2/3/2021 TO 2021 (90 days). Frequency/Duration: 2 times a week for 90 Day(s) MEDICAL/REFERRING DIAGNOSIS:  left knee   DATE OF ONSET: Surgery 21  REFERRING PHYSICIAN: Lara Frances MD MD Orders: Evaluate and Treat  Return MD Appointment: 21     INITIAL ASSESSMENT:  Ms. Iain Starr has attended 11 visits of physical therapy from 21 to 21 with increased ROM/strength L knee. Pt would benefit from continued skilled physical therapy services for continued ROM/strengthening to help return to prior level of function. PROBLEM LIST (Impacting functional limitations):  1. Decreased Strength  2. Decreased ADL/Functional Activities  3. Increased Pain  4. Decreased Flexibility/Joint Mobility  5. Decreased [place] with Home Exercise Program INTERVENTIONS PLANNED: (Treatment may consist of any combination of the following)  1. Balance Exercise  2. Cold  3. Cryotherapy  4. Gait Training  5. Home Exercise Program (HEP)  6. Manual Therapy  7. Range of Motion (ROM)  8. Therapeutic Exercise/Strengthening     GOALS: (Goals have been discussed and agreed upon with patient.)  Short-Term Functional Goals: Time Frame: 4 weeks  1. Pt will increase ROM L knee 0-120 degrees to assist with ascending/descending stairs and household ADL's  2.  Pt will increase strength L knee 4+/5 to assist with ascending/descending stairs and household ADL's  3. Pt will be independent with HEP  Discharge Goals: Time Frame: 12 weeks  1. Pt will increase strength L knee 5/5 to assist with ascending/descending stairs and household ADL's  2. Pt will ascend/descend 10 eight-inch steps independently using a reciprocal pattern and min to no c/o L knee pain  3. Pt will perform 20 minutes household cleaning activities independently with min to n oc/o L knee pain    OUTCOME MEASURE:   Tool Used: Lower Extremity Functional Scale (LEFS)  Score:  Initial: 55/80 Most Recent: 59/80 (Date: -- )   Interpretation of Score: 20 questions each scored on a 5 point scale with 0 representing \"extreme difficulty or unable to perform\" and 4 representing \"no difficulty\". The lower the score, the greater the functional disability. 80/80 represents no disability. Minimal detectable change is 9 points. MEDICAL NECESSITY:   · Patient is expected to demonstrate progress in strength, range of motion and functional technique to increase independence with ascending/descending stairs and household ADL's. · Patient demonstrates good rehab potential due to higher previous functional level. REASON FOR SERVICES/OTHER COMMENTS:  · Patient continues to require skilled intervention due to decreased ROM/strength L knee with increased pain leading to decreased functional status. Total Duration:  PT Patient Time In/Time Out  Time In: 0845  Time Out: 0930    Rehabilitation Potential For Stated Goals: Good  Regarding Cesar Bhakta's therapy, I certify that the treatment plan above will be carried out by a therapist or under their direction.   Thank you for this referral,  Allison Doan PT     Referring Physician Signature: Mari Prakash MD _______________________________ Date _____________     PAIN/SUBJECTIVE:   Initial:   1-2/10 Post Session:  1/10   HISTORY:   History of Injury/Illness (Reason for Referral):  Pt reports tearing the meniscus in her L knee 15 years ago and had surgery with relief of pain until 5 years ago when she started having worsening of her knee pain. She went to orthopedist and was diagnosed with bone-on-bone arthritis. Pt had one gel injection with a couple months relief of pain. She then opted for Left TKA which was performed 01-07-21. Pt spent 1 night in hospital, then returned home. She has had home health physical therapy until 01-24-21. Pt lives with  in a two-story house with bedroom on first floor. She has 3 steps to enter with bilateral handrails. Pt rates her current L knee pain 1-2/10 with twinges of pain at times. She is taking Tylenol prn for her L knee pain. Pt does not work outside the home. Pt states she would like to be able to do yoga again and be able to get into the bathtub. Past Medical History/Comorbidities:   Ms. Gisel Elizalde  has a past medical history of Arthritis, Constipation, High cholesterol, Postmenopausal, and Varicose vein of leg. Ms. Gisel Elizalde  has a past surgical history that includes hx hysterectomy (2002); hx breast biopsy (Left, 03/18/2013); hx cholecystectomy (12/19/2011); hx orthopaedic (Left, 03/20/2014); and hx knee arthroscopy (Left, 2005). Social History/Living Environment:     Pt lives with spouse in a two-story house  Prior Level of Function/Work/Activity:  Pt does not work outside the home  Dominant Side:         RIGHT  Other Clinical Tests:          N/A  Personal Factors:          Sex:  female        Age:  56 y.o. Profession:  Pt does not work outside the home   Ambulatory/Rehab [address] [place] [address] Risk Assessment   Risk Factors:       No Risk Factors Identified Ability to Rise from Chair:       (1)  Pushes up, successful in one attempt   Falls Prevention Plan:       No modifications necessary   Total: (5 or greater = High Risk): 1   ©2010 [employer] Patent #[id number].  Federal Law prohibits the replication, distribution or use without written permission from [facility]   Current Medications:       Current Outpatient Medications:   •  acyclovir (ZOVIRAX) 5 % topical cream, Apply  to affected area as needed (Cold Sores)., Disp: 5 g, Rfl: 1  •  pitavastatin calcium (Livalo) 4 mg tab tablet, Take 1 Tab by mouth nightly., Disp: 90 Tab, Rfl: 1  •  psyllium husk (METAMUCIL PO), Take 1 Dose by mouth every other day., Disp: , Rfl:    Date Last Reviewed:  02-03-21   Number of Personal Factors/Comorbidities that affect the Plan of Care: 1-2: MODERATE COMPLEXITY   EXAMINATION:   Observation/Orthostatic Postural Assessment:          Incision healing well L knee  Palpation:          Generalized tenderness L knee  ROM:    R knee extension = -5 degrees  R knee flexion = 115 degrees    L knee extension = -5 degrees  L knee flexion = 109 degrees    Strength:    R knee extension = 5/5  R knee flexion = 5/5    L knee extension = 4+/5  L knee flexion = 4+/5    Special Tests:          N/A  Neurological Screen:        Sensation: Diminished sensation lateral L knee  Functional Mobility:         Gait/Ambulation:  Pt walks with straight cane and moderate antalgic gait        Transfers:  Pt able ot transition sit to supine and supine to sit independently    Body Structures Involved:  1. Bones  2. Joints  3. Muscles Body Functions Affected:  1. Sensory/Pain  2. Neuromusculoskeletal Activities and Participation Affected:  1. Mobility  2. Domestic Life   Number of elements (examined above) that affect the Plan of Care: 3: MODERATE COMPLEXITY   CLINICAL PRESENTATION:   Presentation: Evolving clinical presentation with changing clinical characteristics: MODERATE COMPLEXITY   CLINICAL DECISION MAKING:   Use of outcome tool(s) and clinical judgement create a POC that gives a: Questionable prediction of patient's progress: MODERATE COMPLEXITY

## 2021-03-15 ENCOUNTER — HOSPITAL ENCOUNTER (OUTPATIENT)
Dept: PHYSICAL THERAPY | Age: 61
Discharge: HOME | End: 2021-03-15
Payer: COMMERCIAL

## 2021-03-15 PROCEDURE — 97140 MANUAL THERAPY 1/> REGIONS: CPT

## 2021-03-15 PROCEDURE — 97110 THERAPEUTIC EXERCISES: CPT

## 2021-03-15 NOTE — PROGRESS NOTES
Cari Salcedo  : 1960  Primary: Ron Daimond [facility]*  Secondary:  Therapy Center at [address], [address], [address] [facility]  [phone]   [phone]         OUTPATIENT PHYSICAL THERAPY: Daily Treatment Note 3/15/2021  Visit Count:  3    ICD-10: Treatment Diagnosis: Pain in left knee (M25.562); Stiffness of left knee, not elsewhere classified (M25.662); Presence of left artificial knee joint (V47.163); Difficulty in walking, not elsewhere classified (R26.2)  Precautions/Allergies:   Patient has no known allergies. TREATMENT PLAN:  Effective Dates: 2/3/2021 TO 2021 (90 days). Frequency/Duration: 2 times a week for 90 Day(s)    Pre-treatment Symptoms/Complaints:  L knee pain, weakness and decreased ROM; Pt states her knee is feeling good today. Pain: Initial:   0/10 Post Session:  1/10   Medications Last Reviewed:  3/15/2021  Updated Objective Findings:  L knee extension = -5 degrees, flexion = 110 degrees AAROM  TREATMENT:     THERAPEUTIC EXERCISE: (25 minutes):  Exercises per grid below to improve mobility and strength. Required minimal verbal cues to promote proper body alignment and promote proper body posture. Progressed resistance and repetitions as indicated. MODALITIES: (0 minutes):      Game Ready cold pack to L knee x10 minutes to decrease soreness and swelling.   Skin clear afterwards   MANUAL THERAPY: (20 minutes):  Manual OP into L knee flexion and extension, scar massage to L knee   Date:  21 Date:  21 Date:  03-15-21   Activity/Exercise      Quad Sets with Towel Roll Under Heel 20 reps  5 sec holds 20 reps  5 sec holds 20 reps  5 sec holds   SLR Flexion x     SAQ's x     Heel Slides with Strap for OP 20 reps  5 sec holds 20 reps  5 sec holds 20 reps  5 sec holds   Gastroc Stretch with Strap On Slantboard  3 reps  20 sec holds On Slantboard  3 reps  20 sec holds On Slantboard  3 reps  20 sec holds   Seated Knee Flexion Stretch with R LE for OP 15 reps  15 sec holds 15 reps  15 sec holds 15 reps  15 sec holds   NuStep Level 4  8 minutes Level 4  8 minutes Level 4  9 minutes   Standing Heel/Toe Raises x     Step-Ups 15 reps     TKE with T-band Black T-band  20 reps Black T-band  20 reps Black T-band  20 reps   Knee Flexion Stretch on Step 15 reps  15 sec holds 15 reps  15 sec holds 15 reps  15 sec holds   LAQ's x     Hamstring Stretch x     Step Downs x     Squats in Parallel Bars 20 reps 20 reps 20 reps   Nautilus Leg Press 85 Lbs  20 reps 85 Lbs  20 reps 85 Lbs  20 reps       [employer] Portal  Treatment/Session Summary:    · Response to Treatment:  Pt toelrated all treatments well today with min c/o L knee pain. Increased ROM L knee today. · Communication/Consultation:  None today  · Equipment provided today:  None today  · Recommendations/Intent for next treatment session: Next visit will focus on progression of ROM/strengthening L knee as tolerable.     Total Treatment Billable Duration:  45 minutes  PT Patient Time In/Time Out  Time In: 0845  Time Out: Morris Hoover, PT    Future Appointments   Date Time Provider Nichole Calderon   3/18/2021  8:45 AM [facility]   3/22/2021  8:00 AM [facility]te[facility]   3/25/2021  8:45 AM Murtis Mayte, PT [facility]   5/12/2021  9:00 AM [facility]

## 2021-03-18 ENCOUNTER — HOSPITAL ENCOUNTER (OUTPATIENT)
Dept: PHYSICAL THERAPY | Age: 61
Discharge: HOME | End: 2021-03-18
Payer: COMMERCIAL

## 2021-03-18 NOTE — PROGRESS NOTES
PT Note:  Pt cancelled her physical therapy appt for today (greater than 24 hrs in advance).     Mary Azar, PT

## 2021-03-22 ENCOUNTER — HOSPITAL ENCOUNTER (OUTPATIENT)
Dept: PHYSICAL THERAPY | Age: 61
Discharge: HOME | End: 2021-03-22
Payer: COMMERCIAL

## 2021-03-22 PROCEDURE — 97110 THERAPEUTIC EXERCISES: CPT

## 2021-03-22 PROCEDURE — 97140 MANUAL THERAPY 1/> REGIONS: CPT

## 2021-03-22 NOTE — PROGRESS NOTES
Corazon   : 1960  Primary: Lucretia Menonmichael Of [facility]*  Secondary:  Therapy Center at [address] [place] [address], [address], [place] [address].  [phone]   [phone]         OUTPATIENT PHYSICAL THERAPY: Daily Treatment Note 3/22/2021  Visit Count:  4    ICD-10: Treatment Diagnosis: Pain in left knee (M25.562); Stiffness of left knee, not elsewhere classified (M25.662); Presence of left artificial knee joint (R47.896); Difficulty in walking, not elsewhere classified (R26.2)  Precautions/Allergies:   Patient has no known allergies. TREATMENT PLAN:  Effective Dates: 2/3/2021 TO 2021 (90 days). Frequency/Duration: 2 times a week for 90 Day(s)    Pre-treatment Symptoms/Complaints:  L knee pain, weakness and decreased ROM; Pt states she has been working hard at home and her knee is doing better. Pain: Initial:   0/10 Post Session:  1/10   Medications Last Reviewed:  3/22/2021  Updated Objective Findings:  L knee extension = -5 degrees, flexion = 115 degrees AAROM  TREATMENT:     THERAPEUTIC EXERCISE: (25 minutes):  Exercises per grid below to improve mobility and strength. Required minimal verbal cues to promote proper body alignment and promote proper body posture. Progressed resistance and repetitions as indicated. MODALITIES: (0 minutes):      Game Ready cold pack to L knee x10 minutes to decrease soreness and swelling.   Skin clear afterwards   MANUAL THERAPY: (20 minutes):  Manual OP into L knee flexion and extension, scar massage to L knee   Date:  21 Date:  21 Date:  03-15-21 Date:  21   Activity/Exercise       Quad Sets with Towel Roll Under Heel 20 reps  5 sec holds 20 reps  5 sec holds 20 reps  5 sec holds 20 reps  5 sec holds   SLR Flexion x      SAQ's x      Heel Slides with Strap for OP 20 reps  5 sec holds 20 reps  5 sec holds 20 reps  5 sec holds 20 reps  5 sec holds   Gastroc Stretch with Strap On Slantboard  3 reps  20 sec holds On Slantboard  3 reps  20 sec holds On Slantboard  3 reps  20 sec holds On Slantboard  3 reps  20 sec holds   Seated Knee Flexion Stretch with R LE for OP 15 reps  15 sec holds 15 reps  15 sec holds 15 reps  15 sec holds 15 reps  15 sec holds   NuStep Level 4  8 minutes Level 4  8 minutes Level 4  9 minutes Level 4  8 minutes   Standing Heel/Toe Raises x      Step-Ups 15 reps      TKE with T-band Black T-band  20 reps Black T-band  20 reps Black T-band  20 reps Black T-band  20 reps   Knee Flexion Stretch on Step 15 reps  15 sec holds 15 reps  15 sec holds 15 reps  15 sec holds 15 reps  15 sec holds   LAQ's x      Hamstring Stretch x      Step Downs x      Squats in Parallel Bars 20 reps 20 reps 20 reps 20 reps   Nautilus Leg Press 85 Lbs  20 reps 85 Lbs  20 reps 85 Lbs  20 reps 85 Lbs  20 reps       [employer] Portal  Treatment/Session Summary:    · Response to Treatment:  Pt toelrated all treatments well today with min c/o L knee pain. ROM L knee continues to improve. · Communication/Consultation:  None today  · Equipment provided today:  None today  · Recommendations/Intent for next treatment session: Next visit will focus on progression of ROM/strengthening L knee as tolerable.     Total Treatment Billable Duration:  45 minutes  PT Patient Time In/Time Out  Time In: 0800  Time Out: [facility],     Future Appointments   Date Time Provider Nichole Calderon   3/25/2021  8:45 AM Yoselin Junoy, PT [facility]   3/29/2021  4:45 PM Yoselin Junoy, PT [facility]   4/1/2021  8:45 AM Yoselin Pavy, PT [facility]   4/5/2021  9:30 AM Yoselin Pavy, PT [facility]   4/8/2021  8:45 AM Yoselin Pavy, PT [facility]   4/12/2021  8:45 AM Yoselin Pavy, PT [facility]   5/12/2021  9:00 AM [facility]

## 2021-03-25 ENCOUNTER — HOSPITAL ENCOUNTER (OUTPATIENT)
Dept: PHYSICAL THERAPY | Age: 61
Discharge: HOME | End: 2021-03-25
Payer: COMMERCIAL

## 2021-03-25 PROCEDURE — 97140 MANUAL THERAPY 1/> REGIONS: CPT

## 2021-03-25 PROCEDURE — 97110 THERAPEUTIC EXERCISES: CPT

## 2021-03-25 NOTE — PROGRESS NOTES
Rafita Nolen  : 1960  Primary: Lashaun Sun Of [facility]*  Secondary:  Therapy Center at [place] [address] [facility], [address], [place] [address].  [phone]   [phone]         OUTPATIENT PHYSICAL THERAPY: Daily Treatment Note 3/25/2021  Visit Count:  11    ICD-10: Treatment Diagnosis: Pain in left knee (M25.562); Stiffness of left knee, not elsewhere classified (M25.662); Presence of left artificial knee joint (T35.765); Difficulty in walking, not elsewhere classified (R26.2)  Precautions/Allergies:   Patient has no known allergies. TREATMENT PLAN:  Effective Dates: 2/3/2021 TO 2021 (90 days). Frequency/Duration: 2 times a week for 90 Day(s)    Pre-treatment Symptoms/Complaints:  L knee pain, weakness and decreased ROM; Pt states she feels stiff today from the rainy weather. Pain: Initial:   0/10 Post Session:  1/10   Medications Last Reviewed:  3/25/2021  Updated Objective Findings:  L knee extension = -5 degrees, flexion = 115 degrees AAROM  TREATMENT:     THERAPEUTIC EXERCISE: (25 minutes):  Exercises per grid below to improve mobility and strength. Required minimal verbal cues to promote proper body alignment and promote proper body posture. Progressed resistance and repetitions as indicated. MODALITIES: (0 minutes):      Game Ready cold pack to L knee x10 minutes to decrease soreness and swelling.   Skin clear afterwards   MANUAL THERAPY: (20 minutes):  Manual OP into L knee flexion and extension, scar massage to L knee   Date:  21 Date:  21 Date:  03-15-21 Date:  21 Date:  21   Activity/Exercise        Quad Sets with Towel Roll Under Heel 20 reps  5 sec holds 20 reps  5 sec holds 20 reps  5 sec holds 20 reps  5 sec holds 20 reps  5 sec holds   SLR Flexion x       SAQ's x       Heel Slides with Strap for OP 20 reps  5 sec holds 20 reps  5 sec holds 20 reps  5 sec holds 20 reps  5 sec holds 20 reps  5 sec holds   Gastroc Stretch with Strap On Slantboard  3 reps  20 sec holds On Slantboard  3 reps  20 sec holds On Slantboard  3 reps  20 sec holds On Slantboard  3 reps  20 sec holds On Slantboard  3 reps  30 sec holds   Seated Knee Flexion Stretch with R LE for OP 15 reps  15 sec holds 15 reps  15 sec holds 15 reps  15 sec holds 15 reps  15 sec holds 15 reps  15 sec holds   NuStep Level 4  8 minutes Level 4  8 minutes Level 4  9 minutes Level 4  8 minutes Level 4  8 minutes   Standing Heel/Toe Raises x       Step-Ups 15 reps       TKE with T-band Black T-band  20 reps Black T-band  20 reps Black T-band  20 reps Black T-band  20 reps Black T-band  20 reps   Knee Flexion Stretch on Step 15 reps  15 sec holds 15 reps  15 sec holds 15 reps  15 sec holds 15 reps  15 sec holds 15 reps  15 sec holds   LAQ's x       Hamstring Stretch x       Step Downs x       Squats in Parallel Bars 20 reps 20 reps 20 reps 20 reps 20 reps   Nautilus Leg Press 85 Lbs  20 reps 85 Lbs  20 reps 85 Lbs  20 reps 85 Lbs  20 reps 110 Lbs  20 reps       [employer] Portal  Treatment/Session Summary:    · Response to Treatment:  Pt toelrated all treatments well today with min c/o L knee pain. Pt was able to get 115 degrees AAROM knee flexion today despite feeling stiff. · Communication/Consultation:  None today  · Equipment provided today:  None today  · Recommendations/Intent for next treatment session: Next visit will focus on progression of ROM/strengthening L knee as tolerable.     Total Treatment Billable Duration:  45 minutes  PT Patient Time In/Time Out  Time In: 0845  Time Out: Morirs Hoover, PT    Future Appointments   Date Time Provider Nichole Calderon   3/29/2021  4:45 PM [place] Pitch, PT [facility]   4/1/2021  8:45 AM [place] Pitch, PT [facility]   4/5/2021  9:30 AM [place] Pitch, PT [facility]   4/8/2021  8:45 AM [place] Pitch, PT [facility]   4/12/2021  8:45 AM [place] Pitch, PT [facility]   5/12/2021  9:00 AM [facility]

## 2021-04-01 ENCOUNTER — HOSPITAL ENCOUNTER (OUTPATIENT)
Dept: PHYSICAL THERAPY | Age: 61
Discharge: HOME | End: 2021-04-01
Payer: COMMERCIAL

## 2021-04-01 PROCEDURE — 97110 THERAPEUTIC EXERCISES: CPT

## 2021-04-01 PROCEDURE — 97140 MANUAL THERAPY 1/> REGIONS: CPT

## 2021-04-01 NOTE — PROGRESS NOTES
Jacque Sih  : 1960  Primary: Shaunna Corolla Of [facility]*  Secondary:  Therapy Center at [address], [address] [place] [address],[address], [place] [address].  [phone]   [phone]         OUTPATIENT PHYSICAL THERAPY: Daily Treatment Note 2021  Visit Count:  12    ICD-10: Treatment Diagnosis: Pain in left knee (M25.562); Stiffness of left knee, not elsewhere classified (M25.662); Presence of left artificial knee joint (R39.237); Difficulty in walking, not elsewhere classified (R26.2)  Precautions/Allergies:   Patient has no known allergies. TREATMENT PLAN:  Effective Dates: 2/3/2021 TO 2021 (90 days). Frequency/Duration: 2 times a week for 90 Day(s)    Pre-treatment Symptoms/Complaints:  L knee pain, weakness and decreased ROM; Pt states her knee is feeling pretty good today. Pain: Initial:   0/10 Post Session:  1/10   Medications Last Reviewed:  2021  Updated Objective Findings:  L knee extension = -5 degrees, flexion = 115 degrees AAROM  TREATMENT:     THERAPEUTIC EXERCISE: (30 minutes):  Exercises per grid below to improve mobility and strength. Required minimal verbal cues to promote proper body alignment and promote proper body posture. Progressed resistance and repetitions as indicated. MODALITIES: (0 minutes):      Game Ready cold pack to L knee x10 minutes to decrease soreness and swelling.   Skin clear afterwards   MANUAL THERAPY: (15 minutes):  Manual OP into L knee flexion and extension, scar massage to L knee   Date:  21 Date:  21 Date:  21 Date:  21   Activity/Exercise       Quad Sets with Towel Roll Under Heel 20 reps  5 sec holds 20 reps  5 sec holds 20 reps  5 sec holds 20 reps  5 sec holds   SLR Flexion       SAQ's       Heel Slides with Strap for OP 20 reps  5 sec holds 20 reps  5 sec holds 20 reps  5 sec holds 20 reps  5 sec holds   Gastroc Stretch with Strap On Slantboard  3 reps  20 sec holds On Slantboard  3 reps  30 sec holds On Slantboard  3 reps  30 sec holds On Slantboard  3 reps  30 sec holds   Seated Knee Flexion Stretch with R LE for OP 15 reps  15 sec holds 15 reps  15 sec holds 15 reps  15 sec holds 15 reps  15 sec holds   NuStep Level 4  8 minutes Level 4  8 minutes Level 4  10 minutes Level 4  10 minutes   Standing Heel/Toe Raises       Step-Ups       TKE with T-band Black T-band  20 reps Black T-band  20 reps Black T-band  20 reps Black T-band  20 reps   Knee Flexion Stretch on Step 15 reps  15 sec holds 15 reps  15 sec holds 15 reps  15 sec holds 15 reps  15 sec holds   LAQ's       Hamstring Stretch       Step Downs       Squats in Parallel Bars 20 reps 20 reps 20 reps 20 reps   Nautilus Leg Press 85 Lbs  20 reps 110 Lbs  20 reps 110 Lbs  20 reps 110 Lbs  20 reps   Stair Training    4 Steps  3 Sets  Reciprocal Pattern  Bilateral Handrails       [employer] Portal  Treatment/Session Summary:    · Response to Treatment:  Pt toelrated all treatments well today with min c/o L knee pain. Pt did well with stair training. · Communication/Consultation:  None today  · Equipment provided today:  None today  · Recommendations/Intent for next treatment session: Next visit will focus on progression of ROM/strengthening L knee as tolerable.     Total Treatment Billable Duration:  45 minutes  PT Patient Time In/Time Out  Time In: 0845  Time Out: Morris Hoover, PT    Future Appointments   Date Time Provider Nichole Calderon   4/5/2021  9:30 AM Mona Arabia, PT [facility]   4/8/2021  8:45 AM Mona Arabia, PT [facility]   4/12/2021  8:45 AM Mona Arabia, PT [facility]   5/12/2021  9:00 AM [facility]

## 2021-04-05 ENCOUNTER — HOSPITAL ENCOUNTER (OUTPATIENT)
Dept: PHYSICAL THERAPY | Age: 61
Discharge: HOME | End: 2021-04-05
Payer: COMMERCIAL

## 2021-04-05 PROCEDURE — 97110 THERAPEUTIC EXERCISES: CPT

## 2021-04-05 PROCEDURE — 97140 MANUAL THERAPY 1/> REGIONS: CPT

## 2021-04-05 NOTE — PROGRESS NOTES
Fatoumata Christiansen  : 1960  Primary:   Secondary:  [facility]rapy Center at [facility]  [address], [address], [facility].  [phone]   [phone]         OUTPATIENT PHYSICAL THERAPY: Daily Treatment Note 2021  Visit Count:  13    ICD-10: Treatment Diagnosis: Pain in left knee (M25.562); Stiffness of left knee, not elsewhere classified (M25.662); Presence of left artificial knee joint (M22.342); Difficulty in walking, not elsewhere classified (R26.2)  Precautions/Allergies:   Patient has no known allergies. TREATMENT PLAN:  Effective Dates: 2/3/2021 TO 2021 (90 days). Frequency/Duration: 2 times a week for 90 Day(s)    Pre-treatment Symptoms/Complaints:  L knee pain, weakness and decreased ROM; Pt reports no new complaints. Pain: Initial:   010 Post Session:  1/10   Medications Last Reviewed:  2021  Updated Objective Findings:  L knee 0-115 degrees AAROM  TREATMENT:     THERAPEUTIC EXERCISE: (25 minutes):  Exercises per grid below to improve mobility and strength. Required minimal verbal cues to promote proper body alignment and promote proper body posture. Progressed resistance and repetitions as indicated. MODALITIES: (0 minutes):      Game Ready cold pack to L knee x10 minutes to decrease soreness and swelling.   Skin clear afterwards   MANUAL THERAPY: (20 minutes):  Manual OP into L knee flexion and extension, scar massage to L knee   Date:  21 Date:  21 Date:  21 Date:  21   Activity/Exercise       Quad Sets with Towel Roll Under Heel 20 reps  5 sec holds 20 reps  5 sec holds 20 reps  5 sec holds 20 reps  5 sec holds   SLR Flexion       SAQ's       Heel Slides with Strap for OP 20 reps  5 sec holds 20 reps  5 sec holds 20 reps  5 sec holds 20 reps  5 sec holds   Gastroc Stretch with Strap On Slantboard  3 reps  20 sec holds On Slantboard  3 reps  30 sec holds On Slantboard  3 reps  30 sec holds On Slantboard  3 reps  30 sec holds   Seated Knee Flexion Stretch with R LE for OP 15 reps  15 sec holds 15 reps  15 sec holds 15 reps  15 sec holds 15 reps  15 sec holds   NuStep Level 4  8 minutes Level 4  8 minutes Level 4  10 minutes Level 4  10 minutes   Standing Heel/Toe Raises       Step-Ups       TKE with T-band Black T-band  20 reps Black T-band  20 reps Black T-band  20 reps Black T-band  20 reps   Knee Flexion Stretch on Step 15 reps  15 sec holds 15 reps  15 sec holds 15 reps  15 sec holds 15 reps  15 sec holds   LAQ's       Hamstring Stretch       Step Downs       Squats in Parallel Bars 20 reps 20 reps 20 reps 20 reps   Nautilus Leg Press 85 Lbs  20 reps 110 Lbs  20 reps 110 Lbs  20 reps 110 Lbs  20 reps       Med[employer] Portal  Treatment/Session Summary:    · Response to Treatment:  Pt toelrated all treatments well today with min c/o L knee pain. L knee 0-115 degrees AAROM. · Communication/Consultation:  None today  · Equipment provided today:  None today  · Recommendations/Intent for next treatment session: Next visit will focus on progression of ROM/strengthening L knee as tolerable.     Total Treatment Billable Duration:  45 minutes  PT Patient Time In/Time Out  Time In: 0930  Time Out: JAIME Huang    Future Appointments   Date Time Provider Nichole Calderon   4/8/2021  8:45 AM Guadalupe Solitario PT [facility]   4/12/2021  8:45 AM JAIME Cates [facility]   5/12/2021  9:00 AM EM[facility]

## 2021-04-08 ENCOUNTER — HOSPITAL ENCOUNTER (OUTPATIENT)
Dept: PHYSICAL THERAPY | Age: 61
Discharge: HOME | End: 2021-04-08
Payer: COMMERCIAL

## 2021-04-08 NOTE — PROGRESS NOTES
PT Note:  Pt cancelled her physical therapy appt for today (greater than 24 hrs in advance).     Israel Padilla, PT

## 2021-04-12 ENCOUNTER — HOSPITAL ENCOUNTER (OUTPATIENT)
Dept: PHYSICAL THERAPY | Age: 61
Discharge: HOME | End: 2021-04-12
Payer: COMMERCIAL

## 2021-04-12 PROCEDURE — 97110 THERAPEUTIC EXERCISES: CPT

## 2021-04-12 PROCEDURE — 97140 MANUAL THERAPY 1/> REGIONS: CPT

## 2021-04-12 NOTE — PROGRESS NOTES
Fatoumata Christiansen  : 1960  Primary:   Secondary:  [facility]rapy Center at [place] [address] [facility], [address], [place] [address].  [phone]   [phone]         OUTPATIENT PHYSICAL THERAPY: Daily Treatment Note 2021  Visit Count:  14    ICD-10: Treatment Diagnosis: Pain in left knee (M25.562); Stiffness of left knee, not elsewhere classified (M25.662); Presence of left artificial knee joint (J88.727); Difficulty in walking, not elsewhere classified (R26.2)  Precautions/Allergies:   Patient has no known allergies. TREATMENT PLAN:  Effective Dates: 2/3/2021 TO 2021 (90 days). Frequency/Duration: 2 times a week for 90 Day(s)    Pre-treatment Symptoms/Complaints:  L knee pain, weakness and decreased ROM; Pt states she had a bad reaction from her 2nd COVID injection and her knee feels stiff this morning. Pain: Initial:   010 Post Session:  1/10   Medications Last Reviewed:  2021  Updated Objective Findings:  L knee 0-115 degrees AAROM  TREATMENT:     THERAPEUTIC EXERCISE: (30 minutes):  Exercises per grid below to improve mobility and strength. Required minimal verbal cues to promote proper body alignment and promote proper body posture. Progressed resistance and repetitions as indicated. MODALITIES: (0 minutes):      Game Ready cold pack to L knee x10 minutes to decrease soreness and swelling.   Skin clear afterwards   MANUAL THERAPY: (15 minutes):  Manual OP into L knee flexion and extension, scar massage to L knee   Date:  21 Date:  21 Date:  21 Date:  21   Activity/Exercise       Quad Sets with Towel Roll Under Heel 20 reps  5 sec holds 20 reps  5 sec holds 20 reps  5 sec holds 20 reps  5 sec holds   SLR Flexion       SAQ's       Heel Slides with Strap for OP 20 reps  5 sec holds 20 reps  5 sec holds 20 reps  5 sec holds 20 reps  5 sec holds   Gastroc Stretch with Strap On Slantboard  3 reps  30 sec holds On Slantboard  3 reps  30 sec holds On Slantboard  3 reps  30 sec holds On Slantboard  3 reps  30 sec holds   Seated Knee Flexion Stretch with R LE for OP 15 reps  15 sec holds 15 reps  15 sec holds 15 reps  15 sec holds 15 reps  15 sec holds   NuStep Level 4  8 minutes Level 4  10 minutes Level 4  10 minutes Level 4  10 minutes   Standing Heel/Toe Raises       Step-Ups       TKE with T-band Black T-band  20 reps Black T-band  20 reps Black T-band  20 reps Black T-band  20 reps   Knee Flexion Stretch on Step 15 reps  15 sec holds 15 reps  15 sec holds 15 reps  15 sec holds 15 reps  15 sec holds   LAQ's       Hamstring Stretch       Step Downs       Squats in Parallel Bars 20 reps 20 reps 20 reps 20 reps   Nautilus Leg Press 110 Lbs  20 reps 110 Lbs  20 reps 110 Lbs  20 reps 110 Lbs  20 reps       MedBridge Portal  Treatment/Session Summary:    · Response to Treatment:  Pt toelrated all treatments well today with min c/o L knee pain. Continue 1x/wk for 2 more weeks, then possible discharge to independent [facility]. · Communication/Consultation:  None today  · Equipment provided today:  None today  · Recommendations/Intent for next treatment session: Next visit will focus on progression of ROM/strengthening L knee as tolerable.     Total Treatment Billable Duration:  45 minutes  PT Patient Time In/Time Out  Time In: 0845  Time Out: Morris Hoover PT    Future Appointments   Date Time Provider Nichole Calderon   4/19/2021  8:45 AM Frances Atkins PT [facility]   4/26/2021  8:45 AM JAIME Frias E   5/12/2021  9:00 AM EM[facility]

## 2021-04-19 ENCOUNTER — HOSPITAL ENCOUNTER (OUTPATIENT)
Dept: PHYSICAL THERAPY | Age: 61
Discharge: HOME | End: 2021-04-19
Payer: COMMERCIAL

## 2021-04-19 PROCEDURE — 97110 THERAPEUTIC EXERCISES: CPT

## 2021-04-19 PROCEDURE — 97140 MANUAL THERAPY 1/> REGIONS: CPT

## 2021-04-19 NOTE — PROGRESS NOTES
Dearjosué Loza  : 1960  Primary:   Secondary:  [facility]rapy Center at [facility]  [address], [address], [facility].  [phone]   [phone]         OUTPATIENT PHYSICAL THERAPY: Daily Treatment Note 2021  Visit Count:  6    ICD-10: Treatment Diagnosis: Pain in left knee (M25.562); Stiffness of left knee, not elsewhere classified (M25.662); Presence of left artificial knee joint (X87.104); Difficulty in walking, not elsewhere classified (R26.2)  Precautions/Allergies:   Patient has no known allergies. TREATMENT PLAN:  Effective Dates: 2/3/2021 TO 2021 (90 days). Frequency/Duration: 2 times a week for 90 Day(s)    Pre-treatment Symptoms/Complaints:  L knee pain, weakness and decreased ROM; Pt states her knee is feeling pretty good this morning. Pain: Initial:   0/10 Post Session:  1/10   Medications Last Reviewed:  2021  Updated Objective Findings:  L knee 0-112 degrees AAROM  TREATMENT:     THERAPEUTIC EXERCISE: (35 minutes):  Exercises per grid below to improve mobility and strength. Required minimal verbal cues to promote proper body alignment and promote proper body posture. Progressed resistance and repetitions as indicated. MODALITIES: (0 minutes):      Game Ready cold pack to L knee x10 minutes to decrease soreness and swelling.   Skin clear afterwards   MANUAL THERAPY: (10 minutes):  Manual OP into L knee flexion and extension   Date:  21 Date:  21 Date:  21 Date:  21   Activity/Exercise       Quad Sets with Towel Roll Under Heel 20 reps  5 sec holds 20 reps  5 sec holds 20 reps  5 sec holds 20 reps  5 sec holds   SLR Flexion       SAQ's       Heel Slides with Strap for OP 20 reps  5 sec holds 20 reps  5 sec holds 20 reps  5 sec holds 20 reps  5 sec holds   Gastroc Stretch with Strap On Slantboard  3 reps  30 sec holds On Slantboard  3 reps  30 sec holds On Slantboard  3 reps  30 sec holds On Slantboard  3 reps  30 sec holds   Seated Knee Flexion Stretch with R LE for OP 15 reps  15 sec holds 15 reps  15 sec holds 15 reps  15 sec holds 15 reps  15 sec holds   NuStep Level 4  10 minutes Level 4  10 minutes Level 4  10 minutes Level 4  8 minutes   Standing Heel/Toe Raises       Step-Ups       TKE with T-band Black T-band  20 reps Black T-band  20 reps Black T-band  20 reps    Knee Flexion Stretch on Step 15 reps  15 sec holds 15 reps  15 sec holds 15 reps  15 sec holds 15 reps  15 sec holds   LAQ's       Hamstring Stretch       Step Downs       Squats in Parallel Bars 20 reps 20 reps 20 reps 20 reps   Nautilus Leg Press 110 Lbs  20 reps 110 Lbs  20 reps 110 Lbs  20 reps 110 Lbs  20 reps       [employer] Portal  Treatment/Session Summary:    · Response to Treatment:  Pt toelrated all treatments well today with min c/o L knee pain. Continue 1x/wk for 1 more week, then possible discharge to independent [facility]. · Communication/Consultation:  None today  · Equipment provided today:  None today  · Recommendations/Intent for next treatment session: Next visit will focus on progression of ROM/strengthening L knee as tolerable.     Total Treatment Billable Duration:  45 minutes  PT Patient Time In/Time Out  Time In: 0845  Time Out: [address], PT    Future Appointments   Date Time Provider Nichole Calderon   4/26/2021  8:45 AM Radha Mckinley PT [facility]FE   5/12/2021  9:00 AM EMG LAB [facility]

## 2021-04-26 ENCOUNTER — HOSPITAL ENCOUNTER (OUTPATIENT)
Dept: PHYSICAL THERAPY | Age: 61
Discharge: HOME | End: 2021-04-26
Payer: COMMERCIAL

## 2021-04-26 PROCEDURE — 97140 MANUAL THERAPY 1/> REGIONS: CPT

## 2021-04-26 PROCEDURE — 97110 THERAPEUTIC EXERCISES: CPT

## 2021-04-26 NOTE — PROGRESS NOTES
Michelle Angelnthayde  : 1960  Primary:   Secondary:  Therapy Center at [place] [id number] [facility], [address] [place] [address],[address] [facility], [place] [address].  [phone]   [phone]         OUTPATIENT PHYSICAL THERAPY: Daily Treatment Note 2021  Visit Count:  15    ICD-10: Treatment Diagnosis: Pain in left knee (M25.562); Stiffness of left knee, not elsewhere classified (M25.662); Presence of left artificial knee joint (P55.371); Difficulty in walking, not elsewhere classified (R26.2)  Precautions/Allergies:   Patient has no known allergies. TREATMENT PLAN:  Effective Dates: 2/3/2021 TO 2021 (90 days). Frequency/Duration: 2 times a week for 90 Day(s)    Pre-treatment Symptoms/Complaints:  L knee pain, weakness and decreased ROM; Pt states she continues to work her knee hard at home. Pain: Initial:   0/10 Post Session:  1/10   Medications Last Reviewed:  2021  Updated Objective Findings:  L knee 0-116 degrees AAROM  TREATMENT:     THERAPEUTIC EXERCISE: (35 minutes):  Exercises per grid below to improve mobility and strength. Required minimal verbal cues to promote proper body alignment and promote proper body posture. Progressed resistance and repetitions as indicated. MODALITIES: (0 minutes):      Game Ready cold pack to L knee x10 minutes to decrease soreness and swelling.   Skin clear afterwards   MANUAL THERAPY: (10 minutes):  Manual OP into L knee flexion and extension   Date:  21 Date:  21 Date:  21 Date:  21 Date:  21   Activity/Exercise        Quad Sets with Towel Roll Under Heel 20 reps  5 sec holds 20 reps  5 sec holds 20 reps  5 sec holds 20 reps  5 sec holds 20 reps  5 sec holds   SLR Flexion        SAQ's        Heel Slides with Strap for OP 20 reps  5 sec holds 20 reps  5 sec holds 20 reps  5 sec holds 20 reps  5 sec holds 20 reps  5 sec holds   Gastroc Stretch with Strap On Slantboard  3 reps  30 sec holds On Slantboard  3 reps  30 sec holds On Slantboard  3 reps  30 sec holds On Slantboard  3 reps  30 sec holds On Slantboard  3 reps  20 sec holds   Seated Knee Flexion Stretch with R LE for OP 15 reps  15 sec holds 15 reps  15 sec holds 15 reps  15 sec holds 15 reps  15 sec holds 15 reps  15 sec holds   NuStep Level 4  10 minutes Level 4  10 minutes Level 4  10 minutes Level 4  8 minutes Level 4  8 minutes   Standing Heel/Toe Raises        Step-Ups        TKE with T-band Black T-band  20 reps Black T-band  20 reps Black T-band  20 reps     Knee Flexion Stretch on Step 15 reps  15 sec holds 15 reps  15 sec holds 15 reps  15 sec holds 15 reps  15 sec holds 15 reps  15 sec holds   LAQ's        Hamstring Stretch        Step Downs        Squats in Parallel Bars 20 reps 20 reps 20 reps 20 reps 20 reps   Nautilus Leg Press 110 Lbs  20 reps 110 Lbs  20 reps 110 Lbs  20 reps 110 Lbs  20 reps 110 Lbs  20 reps       MedBridge Portal  Treatment/Session Summary:    · Response to Treatment:  Pt toelrated all treatments well today with min c/o L knee pain. Discharge from PT to independent [facility]. · Communication/Consultation:  None today  · Equipment provided today:  None today  · Recommendations/Intent for next treatment session: Next visit will focus on progression of ROM/strengthening L knee as tolerable.     Total Treatment Billable Duration:  45 minutes  PT Patient Time In/Time Out  Time In: 0845  Time Out: Morris Hoover, PT    Future Appointments   Date Time Provider Nichole Calderon   5/12/2021  9:00 AM [facility]

## 2021-06-14 NOTE — THERAPY DISCHARGE
Kiki Steele : 1960 Primary: [facility]* Secondary:  Therapy Center at [place] [address] [facility], [address], [address] [facility] [phone]   [phone] OUTPATIENT PHYSICAL THERAPY:Discharge Summary 2021 ICD-10: Treatment Diagnosis: Pain in left knee (M25.562); Stiffness of left knee, not elsewhere classified (M25.662); Presence of left artificial knee joint (X60.274); Difficulty in walking, not elsewhere classified (R26.2) Precautions/Allergies:  
Patient has no known allergies. TREATMENT PLAN: 
Effective Dates: 2/3/2021 TO 2021 (90 days). Frequency/Duration: 2 times a week for 90 Day(s) MEDICAL/REFERRING DIAGNOSIS: 
left knee DATE OF ONSET: Surgery 21 REFERRING PHYSICIAN: Kelli Diana MD MD Orders: Evaluate and Treat Return MD Appointment: 21 INITIAL ASSESSMENT:  Ms. Mellisa Hamilton was last seen for PT on 21. She was doing well on that visit and was discharged to independent [facility]. PROBLEM LIST (Impacting functional limitations): 1. Decreased Strength 2. Decreased ADL/Functional Activities 3. Increased Pain 4. Decreased Flexibility/Joint Mobility 5. Decreased [place] with Home Exercise Program INTERVENTIONS PLANNED: (Treatment may consist of any combination of the following) 1. Balance Exercise 2. Cold 3. Cryotherapy 4. Gait Training 5. Home Exercise Program (HEP) 6. Manual Therapy 7. Range of Motion (ROM) 8. Therapeutic Exercise/Strengthening GOALS: (Goals have been discussed and agreed upon with patient.) Short-Term Functional Goals: Time Frame: 4 weeks 1. Pt will increase ROM L knee 0-120 degrees to assist with ascending/descending stairs and household ADL's 2. Pt will increase strength L knee 4+/5 to assist with ascending/descending stairs and household ADL's 3. Pt will be independent with HEP Discharge Goals: Time Frame: 12 weeks 1.  Pt will increase strength L knee 5/5 to assist with ascending/descending stairs and household ADL's 2. Pt will ascend/descend 10 eight-inch steps independently using a reciprocal pattern and min to no c/o L knee pain 3. Pt will perform 20 minutes household cleaning activities independently with min to n oc/o L knee pain OUTCOME MEASURE:  
Tool Used: Lower Extremity Functional Scale (LEFS) Score:  Initial: 55/80 Most Recent: 59/80 (Date: -- ) Interpretation of Score: 20 questions each scored on a 5 point scale with 0 representing \"extreme difficulty or unable to perform\" and 4 representing \"no difficulty\". The lower the score, the greater the functional disability. 80/80 represents no disability. Minimal detectable change is 9 points. MEDICAL NECESSITY:  
· Patient is expected to demonstrate progress in strength, range of motion and functional technique to increase independence with ascending/descending stairs and household ADL's. · Patient demonstrates good rehab potential due to higher previous functional level. REASON FOR SERVICES/OTHER COMMENTS: 
· Patient continues to require skilled intervention due to decreased ROM/strength L knee with increased pain leading to decreased functional status. Total Duration: PT Patient Time In/Time Out Time In: 0845 Time Out: 0930 Rehabilitation Potential For Stated Goals: Good Regarding Elliot Bhakta's therapy, I certify that the treatment plan above will be carried out by a therapist or under their direction. Thank you for this referral, 
Irma Villasenor, PT Referring Physician Signature: Srinivasa Macias MD _______________________________ Date _____________ PAIN/SUBJECTIVE:  
Initial:   1-2/10 Post Session:  1/10 HISTORY:  
History of Injury/Illness (Reason for Referral): 
Pt reports tearing the meniscus in her L knee 15 years ago and had surgery with relief of pain until 5 years ago when she started having worsening of her knee pain.   She went to orthopedist and was diagnosed with bone-on-bone arthritis. Pt had one gel injection with a couple months relief of pain. She then opted for Left TKA which was performed 01-07-21. Pt spent 1 night in hospital, then returned home. She has had home health physical therapy until 01-24-21. Pt lives with  in a two-story house with bedroom on first floor. She has 3 steps to enter with bilateral handrails. Pt rates her current L knee pain 1-2/10 with twinges of pain at times. She is taking Tylenol prn for her L knee pain. Pt does not work outside the home. Pt states she would like to be able to do yoga again and be able to get into the bathtub. Past Medical History/Comorbidities: Ms. Madison Hyman  has a past medical history of Arthritis, Constipation, High cholesterol, Postmenopausal, and Varicose vein of leg. Ms. Madison Hyman  has a past surgical history that includes hx hysterectomy (2002); hx breast biopsy (Left, 03/18/2013); hx cholecystectomy (12/19/2011); hx orthopaedic (Left, 03/20/2014); and hx knee arthroscopy (Left, 2005). Social History/Living Environment:  
  Pt lives with spouse in a two-story house Prior Level of Function/Work/Activity: 
Pt does not work outside the home Dominant Side:  
      RIGHT Other Clinical Tests: N/A Personal Factors:   
      Sex:  female Age:  56 y.o. Profession:  Pt does not work outside the home Ambulatory/Rehab Services H2 Model Falls Risk Assessment Risk Factors: 
     No Risk Factors Identified Ability to Rise from Chair: 
     (1)  Pushes up, successful in one attempt Falls Prevention Plan: No modifications necessary Total: (5 or greater = High Risk): 1 ©2010 [facility] [address] [place] [address] States Patent #[id number]. Federal Law prohibits the replication, distribution or use without written permission from [facility] Current Medications:   
  
Current Outpatient Medications:  
  pitavastatin calcium (Livalo) 2 mg tablet, Take 1 Tablet by mouth daily. , Disp: 90 Tablet, Rfl: 3 
  acyclovir (ZOVIRAX) 5 % topical cream, Apply  to affected area as needed (Cold Sores). , Disp: 5 g, Rfl: 1   psyllium husk (METAMUCIL PO), Take 1 Dose by mouth every other day., Disp: , Rfl:   
Date Last Reviewed:  02-03-21 Number of Personal Factors/Comorbidities that affect the Plan of Care: 1-2: MODERATE COMPLEXITY EXAMINATION:  
Observation/Orthostatic Postural Assessment:   
      Incision healing well L knee Palpation:   
      Generalized tenderness L knee ROM:   
R knee extension = -5 degrees R knee flexion = 115 degrees L knee extension = -5 degrees L knee flexion = 109 degrees Strength:   
R knee extension = 5/5 R knee flexion = 5/5 L knee extension = 4+/5 L knee flexion = 4+/5 Special Tests: N/A Neurological Screen: 
      Sensation: Diminished sensation lateral L knee Functional Mobility:  
      Gait/Ambulation:  Pt walks with straight cane and moderate antalgic gait Transfers:  Pt able ot transition sit to supine and supine to sit independently Body Structures Involved: 1. Bones 2. Joints 3. Muscles Body Functions Affected: 1. Sensory/Pain 2. Neuromusculoskeletal Activities and Participation Affected: 1. Mobility 2. Domestic Life Number of elements (examined above) that affect the Plan of Care: 3: MODERATE COMPLEXITY CLINICAL PRESENTATION:  
Presentation: Evolving clinical presentation with changing clinical characteristics: MODERATE COMPLEXITY CLINICAL DECISION MAKING:  
Use of outcome tool(s) and clinical judgement create a POC that gives a: Questionable prediction of patient's progress: MODERATE COMPLEXITY

## 2022-03-18 PROBLEM — M17.12 ARTHRITIS OF KNEE, LEFT: Status: ACTIVE | Noted: 2021-01-07

## 2022-03-18 PROBLEM — Z12.11 SPECIAL SCREENING FOR MALIGNANT NEOPLASMS, COLON: Status: ACTIVE | Noted: 2020-08-10

## 2022-03-19 PROBLEM — M17.12 ARTHRITIS OF LEFT KNEE: Status: ACTIVE | Noted: 2021-01-08

## 2022-03-19 PROBLEM — Z96.652 TOTAL KNEE REPLACEMENT STATUS, LEFT: Status: ACTIVE | Noted: 2021-01-08

## 2022-04-11 ENCOUNTER — TRANSCRIBE ORDER (OUTPATIENT)
Dept: SCHEDULING | Age: 62
End: 2022-04-11

## 2022-04-11 DIAGNOSIS — Z12.31 VISIT FOR SCREENING MAMMOGRAM: Primary | ICD-10-CM

## 2022-06-30 ENCOUNTER — HOSPITAL ENCOUNTER (OUTPATIENT)
Dept: MAMMOGRAPHY | Age: 62
Discharge: HOME | End: 2022-07-03
Payer: COMMERCIAL

## 2022-06-30 DIAGNOSIS — Z12.31 ENCOUNTER FOR SCREENING MAMMOGRAM FOR BREAST CANCER: ICD-10-CM

## 2022-06-30 PROCEDURE — 77063 BREAST TOMOSYNTHESIS BI: CPT

## 2022-07-11 ENCOUNTER — HOSPITAL ENCOUNTER (OUTPATIENT)
Dept: MAMMOGRAPHY | Age: 62
Discharge: HOME | End: 2022-07-14
Payer: COMMERCIAL

## 2022-07-11 ENCOUNTER — APPOINTMENT (OUTPATIENT)
Dept: MAMMOGRAPHY | Age: 62
End: 2022-07-11
Payer: COMMERCIAL

## 2022-07-11 DIAGNOSIS — R92.8 ABNORMAL SCREENING MAMMOGRAM: ICD-10-CM

## 2022-07-11 PROCEDURE — 77065 DX MAMMO INCL CAD UNI: CPT

## 2023-05-03 ENCOUNTER — OFFICE VISIT (OUTPATIENT)
Dept: FAMILY MEDICINE CLINIC | Facility: CLINIC | Age: 63
End: 2023-05-03
Payer: COMMERCIAL

## 2023-05-03 VITALS
WEIGHT: 179.2 LBS | HEIGHT: 63 IN | HEART RATE: 81 BPM | SYSTOLIC BLOOD PRESSURE: 132 MMHG | BODY MASS INDEX: 31.75 KG/M2 | OXYGEN SATURATION: 95 % | DIASTOLIC BLOOD PRESSURE: 87 MMHG

## 2023-05-03 DIAGNOSIS — G52.0: ICD-10-CM

## 2023-05-03 DIAGNOSIS — B00.9 HERPES SIMPLEX: Primary | ICD-10-CM

## 2023-05-03 PROCEDURE — 99213 OFFICE O/P EST LOW 20 MIN: CPT | Performed by: FAMILY MEDICINE

## 2023-05-03 RX ORDER — ACYCLOVIR 400 MG/1
400 TABLET ORAL 3 TIMES DAILY
Qty: 30 TABLET | Refills: 5 | Status: SHIPPED | OUTPATIENT
Start: 2023-05-03 | End: 2023-05-13

## 2023-05-03 SDOH — ECONOMIC STABILITY: FOOD INSECURITY: WITHIN THE PAST 12 MONTHS, THE FOOD YOU BOUGHT JUST DIDN'T LAST AND YOU DIDN'T HAVE MONEY TO GET MORE.: NEVER TRUE

## 2023-05-03 SDOH — ECONOMIC STABILITY: HOUSING INSECURITY
IN THE LAST 12 MONTHS, WAS THERE A TIME WHEN YOU DID NOT HAVE A STEADY PLACE TO SLEEP OR SLEPT IN A SHELTER (INCLUDING NOW)?: NO

## 2023-05-03 SDOH — ECONOMIC STABILITY: INCOME INSECURITY: HOW HARD IS IT FOR YOU TO PAY FOR THE VERY BASICS LIKE FOOD, HOUSING, MEDICAL CARE, AND HEATING?: NOT HARD AT ALL

## 2023-05-03 SDOH — ECONOMIC STABILITY: FOOD INSECURITY: WITHIN THE PAST 12 MONTHS, YOU WORRIED THAT YOUR FOOD WOULD RUN OUT BEFORE YOU GOT MONEY TO BUY MORE.: NEVER TRUE

## 2023-05-03 ASSESSMENT — PATIENT HEALTH QUESTIONNAIRE - PHQ9
1. LITTLE INTEREST OR PLEASURE IN DOING THINGS: 0
SUM OF ALL RESPONSES TO PHQ QUESTIONS 1-9: 0
SUM OF ALL RESPONSES TO PHQ9 QUESTIONS 1 & 2: 0
SUM OF ALL RESPONSES TO PHQ QUESTIONS 1-9: 0
2. FEELING DOWN, DEPRESSED OR HOPELESS: 0

## 2023-05-03 NOTE — PROGRESS NOTES
Subjective:   Domonique Daliy is a 57 y.o. female presents today for a burning and painful lesions that started out on her lower lip about 3 days ago they have blossomed and are involving the entire lip and now the nasal tip. Sore, itching, ointment not working. Has been under a lot of stress recently with her  passing away    No Known Allergies  PMH, MEDS reviewed     Objective:   Blood pressure 132/87, pulse 81, height 5' 2.5\" (1.588 m), weight 179 lb 3.2 oz (81.3 kg), SpO2 95 %. General- Pleasant and no distress  Psych- alert and oriented to person, place and time  Mood and affect are appropriate to situation  Musculoskeletal - Gait and station examination reveals mid-position with no abnormalities. Neurological- grossly intact. rrr s mrg. bcta  Skin exam of the area in question reveals erythematous papules and blistering involving the entire lower lip and part of the upper lip. The nose is involved as well primarily the nasal tip which is swollen. Assessment/Plan:     1. Herpes simplex    2. Nasociliary neuralgia      acyclovir generic 400mg tid until lesions are no longer blistering and she is feeling better. Cautionary consultation with ophthalmology as she is beginning to feel sensation of may be itching in the eyes but she is not sure. The nasociliary branches involved I would like to make sure she is not incurring an ophthalmologic or infection    No follow-up provider specified.

## 2023-08-02 ENCOUNTER — TELEPHONE (OUTPATIENT)
Dept: FAMILY MEDICINE CLINIC | Facility: CLINIC | Age: 63
End: 2023-08-02

## 2023-08-02 DIAGNOSIS — Z80.3 FAMILY HX-BREAST MALIGNANCY: Primary | ICD-10-CM

## 2023-08-02 DIAGNOSIS — Z12.11 SPECIAL SCREENING FOR MALIGNANT NEOPLASMS, COLON: ICD-10-CM

## 2023-08-02 DIAGNOSIS — R92.2 DENSE BREASTS: ICD-10-CM

## 2023-08-02 NOTE — TELEPHONE ENCOUNTER
----- Message from Jeni Banda sent at 8/1/2023  9:04 AM EDT -----  Subject: Referral Request    Reason for referral request? Pt wants to get a 3D diagnostic mammogram as   Pt has dense breast tissue. Pt's last one, [facility] DIGITAL DIAGNOSTIC W OR WO   CAD RIGHT, was on 7/5/22 at [facility] [place]. Pt also wants to  an   ordered cologuard box and mail in the results. Pt would like PCP to order   both prior to her 8/14 welcome to medicare AWV with him so they can go   over the results together. Provider patient wants to be referred to(if known):     Provider Phone Number(if known):     Additional Information for Provider?   ---------------------------------------------------------------------------  --------------  Francis [place] Patrick    [phone]; OK to leave message on voicemail  ---------------------------------------------------------------------------  --------------

## 2023-08-14 ENCOUNTER — OFFICE VISIT (OUTPATIENT)
Dept: FAMILY MEDICINE CLINIC | Facility: CLINIC | Age: 63
End: 2023-08-14
Payer: MEDICARE

## 2023-08-14 VITALS
OXYGEN SATURATION: 97 % | HEART RATE: 87 BPM | TEMPERATURE: 97.3 F | DIASTOLIC BLOOD PRESSURE: 60 MMHG | SYSTOLIC BLOOD PRESSURE: 135 MMHG | WEIGHT: 173.2 LBS | BODY MASS INDEX: 30.69 KG/M2 | HEIGHT: 63 IN

## 2023-08-14 DIAGNOSIS — Z23 IMMUNIZATION DUE: ICD-10-CM

## 2023-08-14 DIAGNOSIS — Z00.00 WELCOME TO MEDICARE PREVENTIVE VISIT: Primary | ICD-10-CM

## 2023-08-14 LAB — NONINV COLON CA DNA+OCC BLD SCRN STL QL: NEGATIVE

## 2023-08-14 PROCEDURE — G0402 INITIAL PREVENTIVE EXAM: HCPCS | Performed by: FAMILY MEDICINE

## 2023-08-14 PROCEDURE — 90677 PCV20 VACCINE IM: CPT | Performed by: FAMILY MEDICINE

## 2023-08-14 PROCEDURE — 1123F ACP DISCUSS/DSCN MKR DOCD: CPT | Performed by: FAMILY MEDICINE

## 2023-08-14 PROCEDURE — 3017F COLORECTAL CA SCREEN DOC REV: CPT | Performed by: FAMILY MEDICINE

## 2023-08-14 PROCEDURE — G0009 ADMIN PNEUMOCOCCAL VACCINE: HCPCS | Performed by: FAMILY MEDICINE

## 2023-08-14 ASSESSMENT — LIFESTYLE VARIABLES
HOW OFTEN DO YOU HAVE A DRINK CONTAINING ALCOHOL: MONTHLY OR LESS
HOW MANY STANDARD DRINKS CONTAINING ALCOHOL DO YOU HAVE ON A TYPICAL DAY: PATIENT DOES NOT DRINK

## 2023-08-14 ASSESSMENT — PATIENT HEALTH QUESTIONNAIRE - PHQ9
SUM OF ALL RESPONSES TO PHQ9 QUESTIONS 1 & 2: 0
SUM OF ALL RESPONSES TO PHQ QUESTIONS 1-9: 0
SUM OF ALL RESPONSES TO PHQ QUESTIONS 1-9: 0
2. FEELING DOWN, DEPRESSED OR HOPELESS: 0
1. LITTLE INTEREST OR PLEASURE IN DOING THINGS: 0
SUM OF ALL RESPONSES TO PHQ QUESTIONS 1-9: 0
SUM OF ALL RESPONSES TO PHQ QUESTIONS 1-9: 0

## 2023-08-14 ASSESSMENT — VISUAL ACUITY
OS_CC: 20/25
OD_CC: 20/25

## 2023-08-15 PROBLEM — Z12.11 SPECIAL SCREENING FOR MALIGNANT NEOPLASMS, COLON: Status: ACTIVE | Noted: 2020-08-10

## 2023-08-16 ENCOUNTER — TELEPHONE (OUTPATIENT)
Dept: FAMILY MEDICINE CLINIC | Facility: CLINIC | Age: 63
End: 2023-08-16

## 2023-09-14 PROBLEM — Z12.11 SPECIAL SCREENING FOR MALIGNANT NEOPLASMS, COLON: Status: RESOLVED | Noted: 2020-08-10 | Resolved: 2023-09-14

## 2024-02-16 ENCOUNTER — TELEPHONE (OUTPATIENT)
Dept: FAMILY MEDICINE CLINIC | Facility: CLINIC | Age: 64
End: 2024-02-16

## 2024-02-16 RX ORDER — ACYCLOVIR 50 MG/G
1 CREAM TOPICAL PRN
Qty: 5 G | Refills: 5 | Status: SHIPPED | OUTPATIENT
Start: 2024-02-16

## 2024-02-16 NOTE — TELEPHONE ENCOUNTER
Pt called and stated that she is currently having a cold sore outbreak on her lower lip.  Dr. Schaeffer had prescribed the following medication for her a year ago when this happened and she would like to get a refill before the weekend because she stated it is painful:    Acyclovir (Zovirax) 5% cream    Please use [facility] in [place], [place]    Thank you

## 2025-06-27 ENCOUNTER — TELEPHONE (OUTPATIENT)
Dept: FAMILY MEDICINE CLINIC | Facility: CLINIC | Age: 65
End: 2025-06-27

## 2025-07-29 ENCOUNTER — OFFICE VISIT (OUTPATIENT)
Dept: FAMILY MEDICINE CLINIC | Facility: CLINIC | Age: 65
End: 2025-07-29
Payer: MEDICARE

## 2025-07-29 VITALS
WEIGHT: 172 LBS | TEMPERATURE: 97.7 F | BODY MASS INDEX: 30.48 KG/M2 | HEIGHT: 63 IN | RESPIRATION RATE: 18 BRPM | SYSTOLIC BLOOD PRESSURE: 126 MMHG | DIASTOLIC BLOOD PRESSURE: 87 MMHG | OXYGEN SATURATION: 96 % | HEART RATE: 71 BPM

## 2025-07-29 DIAGNOSIS — Z12.31 OTHER SCREENING MAMMOGRAM: ICD-10-CM

## 2025-07-29 DIAGNOSIS — I83.819 PAIN DUE TO VARICOSE VEINS OF LOWER EXTREMITY: ICD-10-CM

## 2025-07-29 DIAGNOSIS — B00.9 HERPES SIMPLEX: ICD-10-CM

## 2025-07-29 DIAGNOSIS — Z00.00 INITIAL MEDICARE ANNUAL WELLNESS VISIT: Primary | ICD-10-CM

## 2025-07-29 DIAGNOSIS — R92.30 DENSE BREAST TISSUE ON MAMMOGRAM, UNSPECIFIED TYPE: ICD-10-CM

## 2025-07-29 PROBLEM — Z98.890 HISTORY OF COLONOSCOPY: Status: ACTIVE | Noted: 2025-07-29

## 2025-07-29 PROBLEM — Z98.890 HISTORY OF COLONOSCOPY: Status: RESOLVED | Noted: 2025-07-29 | Resolved: 2025-07-29

## 2025-07-29 PROCEDURE — G0438 PPPS, INITIAL VISIT: HCPCS | Performed by: FAMILY MEDICINE

## 2025-07-29 PROCEDURE — 1159F MED LIST DOCD IN RCRD: CPT | Performed by: FAMILY MEDICINE

## 2025-07-29 PROCEDURE — 1123F ACP DISCUSS/DSCN MKR DOCD: CPT | Performed by: FAMILY MEDICINE

## 2025-07-29 RX ORDER — LANOLIN ALCOHOL/MO/W.PET/CERES
400 CREAM (GRAM) TOPICAL DAILY
COMMUNITY

## 2025-07-29 RX ORDER — ACYCLOVIR 400 MG/1
400 TABLET ORAL 3 TIMES DAILY
Qty: 30 TABLET | Refills: 5 | Status: SHIPPED | OUTPATIENT
Start: 2025-07-29 | End: 2025-08-08

## 2025-07-29 RX ORDER — ASPIRIN 81 MG/1
81 TABLET ORAL DAILY
COMMUNITY

## 2025-07-29 RX ORDER — MULTIVITAMIN WITH IRON
1 TABLET ORAL DAILY
COMMUNITY

## 2025-07-29 SDOH — ECONOMIC STABILITY: FOOD INSECURITY: WITHIN THE PAST 12 MONTHS, YOU WORRIED THAT YOUR FOOD WOULD RUN OUT BEFORE YOU GOT MONEY TO BUY MORE.: NEVER TRUE

## 2025-07-29 SDOH — ECONOMIC STABILITY: FOOD INSECURITY: WITHIN THE PAST 12 MONTHS, THE FOOD YOU BOUGHT JUST DIDN'T LAST AND YOU DIDN'T HAVE MONEY TO GET MORE.: NEVER TRUE

## 2025-07-29 ASSESSMENT — LIFESTYLE VARIABLES
HOW OFTEN DO YOU HAVE A DRINK CONTAINING ALCOHOL: 2-4 TIMES A MONTH
HOW MANY STANDARD DRINKS CONTAINING ALCOHOL DO YOU HAVE ON A TYPICAL DAY: 1 OR 2

## 2025-07-29 ASSESSMENT — PATIENT HEALTH QUESTIONNAIRE - PHQ9
1. LITTLE INTEREST OR PLEASURE IN DOING THINGS: NOT AT ALL
SUM OF ALL RESPONSES TO PHQ QUESTIONS 1-9: 0
SUM OF ALL RESPONSES TO PHQ QUESTIONS 1-9: 0
2. FEELING DOWN, DEPRESSED OR HOPELESS: NOT AT ALL
SUM OF ALL RESPONSES TO PHQ QUESTIONS 1-9: 0
SUM OF ALL RESPONSES TO PHQ QUESTIONS 1-9: 0

## 2025-07-29 NOTE — PROGRESS NOTES
Medicare Annual Wellness Visit    Nasrin Ayon is here for Medicare AWV    Assessment & Plan   Initial Medicare annual wellness visit  Other screening mammogram  -     [facility] DIGITAL DIAGNOSTIC W OR WO CAD BILATERAL; Future  Dense breast tissue on mammogram, unspecified type  -     [facility] DIGITAL DIAGNOSTIC W OR WO CAD BILATERAL; Future  Pain due to varicose veins of lower extremity  -     [facility] External Referral To [facility]  Herpes simplex  -     acyclovir (ZOVIRAX) 400 MG tablet; Take 1 tablet by mouth 3 times daily for 10 days, Disp-30 tablet, R-5Normal       No follow-ups on file.     Subjective       Patient's complete Health Risk Assessment and screening values have been reviewed and are found in Flowsheets. The following problems were reviewed today and where indicated follow up appointments were made and/or referrals ordered.    Positive Risk Factor Screenings with Interventions:                Abnormal BMI (obese):  Body mass index is 30.71 kg/m². (!) Abnormal  Interventions:  She struggles                            Objective   Vitals:    07/29/25 1429   BP: 126/87   Pulse: 71   Resp: 18   Temp: 97.7 °F (36.5 °C)   SpO2: 96%   Weight: 78 kg (172 lb)   Height: 1.594 m (5' 2.75\")      Body mass index is 30.71 kg/m².                  No Known Allergies  Prior to Visit Medications    Medication Sig Taking? Authorizing Provider   aspirin 81 MG EC tablet Take 1 tablet by mouth daily Yes Colt Hernández MD   magnesium oxide (MAG-OX) 400 (240 Mg) MG tablet Take 1 tablet by mouth daily Yes Colt Hernández MD   Multiple Vitamin (MULTIVITAMIN) TABS tablet Take 1 tablet by mouth daily Yes Colt Hernández MD   acyclovir (ZOVIRAX) 400 MG tablet Take 1 tablet by mouth 3 times daily for 10 days Yes Mauricio Schaeffer MD   acyclovir (ZOVIRAX) 5 % CREA Apply 1 Tube topically as needed (pain) Yes Sheila Bean, DO   Bilat varicose veins, some spider some superficial.  Causing pain.  Family history

## 2025-08-06 ENCOUNTER — TRANSCRIBE ORDERS (OUTPATIENT)
Dept: SCHEDULING | Age: 65
End: 2025-08-06

## 2025-08-06 DIAGNOSIS — Z12.31 ENCOUNTER FOR SCREENING MAMMOGRAM FOR MALIGNANT NEOPLASM OF BREAST: Primary | ICD-10-CM

## 2025-08-07 ENCOUNTER — HOSPITAL ENCOUNTER (OUTPATIENT)
Dept: MAMMOGRAPHY | Age: 65
Discharge: HOME | End: 2025-08-10
Attending: FAMILY MEDICINE
Payer: MEDICARE

## 2025-08-07 DIAGNOSIS — Z12.31 ENCOUNTER FOR SCREENING MAMMOGRAM FOR MALIGNANT NEOPLASM OF BREAST: ICD-10-CM

## 2025-08-07 PROCEDURE — 77063 BREAST TOMOSYNTHESIS BI: CPT

## (undated) DEVICE — (D)PREP SKN CHLRAPRP APPL 26ML -- CONVERT TO ITEM 371833

## (undated) DEVICE — CLOSURE SKIN FLX NONINVASIVE PRELOC TECHNOLOGY FOR 24IN

## (undated) DEVICE — SYR 50ML LR LCK 1ML GRAD NSAF --

## (undated) DEVICE — BLADE SURG SAW STD S STL OSC W/ SERR EDGE DISP

## (undated) DEVICE — 3M™ STERI-DRAPE™ INSTRUMENT POUCH 1018: Brand: STERI-DRAPE™

## (undated) DEVICE — PIN BNE FIX 4X140MM STRL -- 1EA=2PK MAKO

## (undated) DEVICE — SOLUTION IV 1000ML 0.9% SOD CHL

## (undated) DEVICE — T4 HOOD

## (undated) DEVICE — CLOSURE SKIN FACILITATES COMPATIBILITY W/ CERTAIN IS DSG

## (undated) DEVICE — SUTURE STRATAFIX SYMMETRIC PDS + SZ 1 L18IN ABSRB VLT L48MM SXPP1A400

## (undated) DEVICE — DRAPE SHT 3 QTR PROXIMA 53X77 --

## (undated) DEVICE — TOTAL KNEE DR RIDGEWAY: Brand: MEDLINE INDUSTRIES, INC.

## (undated) DEVICE — MARKER RAD KNEE TIB CKPT STEREOTAXIC IMAG LESION LOC

## (undated) DEVICE — BLADE SAW PAT RMR PILT H 46MM --

## (undated) DEVICE — Device: Brand: POWER-FLO®

## (undated) DEVICE — BUTTON SWITCH PENCIL BLADE ELECTRODE, HOLSTER: Brand: EDGE

## (undated) DEVICE — INSERT TIB SZ 5 THK11MM UNIV KNEE POLYETH CNDYL STBL PRI: Type: IMPLANTABLE DEVICE | Site: KNEE | Status: NON-FUNCTIONAL

## (undated) DEVICE — BNDG COHESIVE 4INX5YD COBAN --

## (undated) DEVICE — DRAPE,TOP,102X53,STERILE: Brand: MEDLINE

## (undated) DEVICE — KIT DRP FOR RIO ROBOTIC ARM ASST SYS

## (undated) DEVICE — BLADE SAW W12.5XL70MM THK0.8MM CUT THK1.12MM S STL RECIP

## (undated) DEVICE — SUTURE VCRL SZ 1 L27IN ABSRB UD L36MM CP-1 1/2 CIR REV CUT J268H

## (undated) DEVICE — SUTURE VCRL SZ 2-0 L27IN ABSRB UD L36MM CP-1 1/2 CIR REV J266H

## (undated) DEVICE — TRAY PREP DRY W/ PREM GLV 2 APPL 6 SPNG 2 UNDPD 1 OVERWRAP

## (undated) DEVICE — SOLUTION IRRIG 3000ML 0.9% SOD CHL FLX CONT 0797208] ICU MEDICAL INC]

## (undated) DEVICE — STERILE PRESSURE PROTECTOR PAD® FOR DE MAYO UNIVERSAL DISTRACTOR® (10/CASE): Brand: DE MAYO UNIVERSAL DISTRACTOR®

## (undated) DEVICE — BIPOLAR SEALER 23-112-1 AQM 6.0: Brand: AQUAMANTYS ®

## (undated) DEVICE — MARKER RAD KNEE FEM CKPT STEREOTAXIC IMAG LESION LOC

## (undated) DEVICE — CORD RETRCT SIL

## (undated) DEVICE — Z DISCONTINUED USE 2744636  DRESSING AQUACEL 14 IN ALG W3.5XL14IN POLYUR FLM CVR W/ HYDRCOLL

## (undated) DEVICE — SOLUTION IV 250ML 0.9% SOD CHL CLR INJ FLX BG CONT PRT CLSR

## (undated) DEVICE — PIN BNE 4X110MM STRL -- 2/PK MAKO

## (undated) DEVICE — KIT PROC KNE TRACKING PK/1 -- VIZADISC MAKO

## (undated) DEVICE — SUTURE STRATAFIX SPRL SZ 1 L14IN ABSRB VLT L48CM CTX 1/2 SXPD2B405

## (undated) DEVICE — REM POLYHESIVE ADULT PATIENT RETURN ELECTRODE: Brand: VALLEYLAB

## (undated) DEVICE — SYR LR LCK 1ML GRAD NSAF 30ML --

## (undated) DEVICE — HANDPIECE SET WITH COAXIAL HIGH FLOW TIP AND SUCTION TUBE: Brand: INTERPULSE

## (undated) DEVICE — CURETTE BNE CEM 10IN DISP --